# Patient Record
Sex: MALE | Race: WHITE | NOT HISPANIC OR LATINO | URBAN - METROPOLITAN AREA
[De-identification: names, ages, dates, MRNs, and addresses within clinical notes are randomized per-mention and may not be internally consistent; named-entity substitution may affect disease eponyms.]

---

## 2022-02-28 ENCOUNTER — PATIENT ROUNDING (BHMG ONLY) (OUTPATIENT)
Dept: INTERNAL MEDICINE | Facility: CLINIC | Age: 52
End: 2022-02-28

## 2022-02-28 ENCOUNTER — OFFICE VISIT (OUTPATIENT)
Dept: INTERNAL MEDICINE | Facility: CLINIC | Age: 52
End: 2022-02-28

## 2022-02-28 ENCOUNTER — TELEPHONE (OUTPATIENT)
Dept: INTERNAL MEDICINE | Facility: CLINIC | Age: 52
End: 2022-02-28

## 2022-02-28 VITALS
DIASTOLIC BLOOD PRESSURE: 88 MMHG | TEMPERATURE: 97.1 F | HEART RATE: 70 BPM | SYSTOLIC BLOOD PRESSURE: 132 MMHG | BODY MASS INDEX: 30.9 KG/M2 | WEIGHT: 215.8 LBS | OXYGEN SATURATION: 100 % | HEIGHT: 70 IN

## 2022-02-28 DIAGNOSIS — Z12.11 ENCOUNTER FOR SCREENING FOR COLORECTAL MALIGNANT NEOPLASM: ICD-10-CM

## 2022-02-28 DIAGNOSIS — Z13.89 SCREENING FOR BLOOD OR PROTEIN IN URINE: ICD-10-CM

## 2022-02-28 DIAGNOSIS — Z12.12 ENCOUNTER FOR SCREENING FOR COLORECTAL MALIGNANT NEOPLASM: ICD-10-CM

## 2022-02-28 DIAGNOSIS — Z00.00 ANNUAL PHYSICAL EXAM: Primary | ICD-10-CM

## 2022-02-28 DIAGNOSIS — E78.5 HYPERLIPIDEMIA, UNSPECIFIED HYPERLIPIDEMIA TYPE: ICD-10-CM

## 2022-02-28 DIAGNOSIS — Z23 NEED FOR TD VACCINE: ICD-10-CM

## 2022-02-28 PROBLEM — B02.9 HERPES ZOSTER WITHOUT COMPLICATION: Status: ACTIVE | Noted: 2022-02-28

## 2022-02-28 PROBLEM — E66.811 OBESITY, CLASS I, BMI 30-34.9: Status: ACTIVE | Noted: 2022-02-28

## 2022-02-28 PROBLEM — E66.9 OBESITY, CLASS I, BMI 30-34.9: Status: ACTIVE | Noted: 2022-02-28

## 2022-02-28 PROCEDURE — 99386 PREV VISIT NEW AGE 40-64: CPT | Performed by: FAMILY MEDICINE

## 2022-02-28 PROCEDURE — 90714 TD VACC NO PRESV 7 YRS+ IM: CPT | Performed by: FAMILY MEDICINE

## 2022-02-28 PROCEDURE — 90471 IMMUNIZATION ADMIN: CPT | Performed by: FAMILY MEDICINE

## 2022-02-28 NOTE — TELEPHONE ENCOUNTER
PATIENT CALLED STATING THAT HE WAS SUPPOSED TO PROVIDE THE INFORMATION FOR HIS OLD PROVIDER:    DR. KAROLINA BALL  [phone]    THIS WAS IN REGARDS TO REQUESTING MEDICAL RECORDS.    CALL BACK IF NEEDED:  [phone]

## 2022-02-28 NOTE — PROGRESS NOTES
February 28, 2022    Hello, may I speak with Tato Jacques?    My name is KIA        Tell me about your visit with us. What things went well?  NO COMPLAINTS       We're always looking for ways to make our patients' experiences even better. Do you have recommendations on ways we may improve?  no    Overall were you satisfied with your first visit to our practice? yes       I appreciate you taking the time to speak with me today. Is there anything else I can do for you? no      Thank you, and have a great day.

## 2022-03-01 LAB
ALBUMIN SERPL-MCNC: 4.7 G/DL (ref 4–5)
ALBUMIN/GLOB SERPL: 1.6 {RATIO} (ref 1.2–2.2)
ALP SERPL-CCNC: 55 IU/L (ref 44–121)
ALT SERPL-CCNC: 36 IU/L (ref 0–44)
APPEARANCE UR: CLEAR
AST SERPL-CCNC: 25 IU/L (ref 0–40)
BACTERIA #/AREA URNS HPF: NORMAL /[HPF]
BASOPHILS # BLD AUTO: 0 X10E3/UL (ref 0–0.2)
BASOPHILS NFR BLD AUTO: 0 %
BILIRUB SERPL-MCNC: 0.9 MG/DL (ref 0–1.2)
BILIRUB UR QL STRIP: NEGATIVE
BUN SERPL-MCNC: 13 MG/DL (ref 6–24)
BUN/CREAT SERPL: 13 (ref 9–20)
CALCIUM SERPL-MCNC: 9.5 MG/DL (ref 8.7–10.2)
CASTS URNS QL MICRO: NORMAL /LPF
CHLORIDE SERPL-SCNC: 101 MMOL/L (ref 96–106)
CHOLEST SERPL-MCNC: 161 MG/DL (ref 100–199)
CO2 SERPL-SCNC: 25 MMOL/L (ref 20–29)
COLOR UR: YELLOW
CREAT SERPL-MCNC: 1.04 MG/DL (ref 0.76–1.27)
EGFR GENE MUT ANL BLD/T: 87 ML/MIN/1.73
EOSINOPHIL # BLD AUTO: 0.1 X10E3/UL (ref 0–0.4)
EOSINOPHIL NFR BLD AUTO: 2 %
EPI CELLS #/AREA URNS HPF: NORMAL /HPF (ref 0–10)
ERYTHROCYTE [DISTWIDTH] IN BLOOD BY AUTOMATED COUNT: 13.8 % (ref 11.6–15.4)
FT4I SERPL CALC-MCNC: 1.9 (ref 1.2–4.9)
GLOBULIN SER CALC-MCNC: 3 G/DL (ref 1.5–4.5)
GLUCOSE SERPL-MCNC: 91 MG/DL (ref 65–99)
GLUCOSE UR QL STRIP: NEGATIVE
HCT VFR BLD AUTO: 44.9 % (ref 37.5–51)
HDLC SERPL-MCNC: 31 MG/DL
HGB BLD-MCNC: 15.8 G/DL (ref 13–17.7)
HGB UR QL STRIP: NEGATIVE
IMM GRANULOCYTES # BLD AUTO: 0 X10E3/UL (ref 0–0.1)
IMM GRANULOCYTES NFR BLD AUTO: 0 %
KETONES UR QL STRIP: NEGATIVE
LDLC SERPL CALC-MCNC: 84 MG/DL (ref 0–99)
LEUKOCYTE ESTERASE UR QL STRIP: NEGATIVE
LYMPHOCYTES # BLD AUTO: 1.8 X10E3/UL (ref 0.7–3.1)
LYMPHOCYTES NFR BLD AUTO: 40 %
MCH RBC QN AUTO: 32.9 PG (ref 26.6–33)
MCHC RBC AUTO-ENTMCNC: 35.2 G/DL (ref 31.5–35.7)
MCV RBC AUTO: 94 FL (ref 79–97)
MICRO URNS: NORMAL
MICRO URNS: NORMAL
MONOCYTES # BLD AUTO: 0.5 X10E3/UL (ref 0.1–0.9)
MONOCYTES NFR BLD AUTO: 11 %
NEUTROPHILS # BLD AUTO: 2.2 X10E3/UL (ref 1.4–7)
NEUTROPHILS NFR BLD AUTO: 47 %
NITRITE UR QL STRIP: NEGATIVE
PH UR STRIP: 5.5 [PH] (ref 5–7.5)
PLATELET # BLD AUTO: 208 X10E3/UL (ref 150–450)
POTASSIUM SERPL-SCNC: 4.4 MMOL/L (ref 3.5–5.2)
PROT SERPL-MCNC: 7.7 G/DL (ref 6–8.5)
PROT UR QL STRIP: NEGATIVE
RBC # BLD AUTO: 4.8 X10E6/UL (ref 4.14–5.8)
RBC #/AREA URNS HPF: NORMAL /HPF (ref 0–2)
SODIUM SERPL-SCNC: 138 MMOL/L (ref 134–144)
SP GR UR STRIP: 1.01 (ref 1–1.03)
T3RU NFR SERPL: 27 % (ref 24–39)
T4 SERPL-MCNC: 7.1 UG/DL (ref 4.5–12)
TRIGL SERPL-MCNC: 278 MG/DL (ref 0–149)
TSH SERPL DL<=0.005 MIU/L-ACNC: 1.55 UIU/ML (ref 0.45–4.5)
UROBILINOGEN UR STRIP-MCNC: 0.2 MG/DL (ref 0.2–1)
VLDLC SERPL CALC-MCNC: 46 MG/DL (ref 5–40)
WBC # BLD AUTO: 4.7 X10E3/UL (ref 3.4–10.8)
WBC #/AREA URNS HPF: NORMAL /HPF (ref 0–5)

## 2022-08-01 ENCOUNTER — OFFICE VISIT (OUTPATIENT)
Dept: INTERNAL MEDICINE | Facility: CLINIC | Age: 52
End: 2022-08-01

## 2022-08-01 VITALS
SYSTOLIC BLOOD PRESSURE: 122 MMHG | TEMPERATURE: 97.3 F | DIASTOLIC BLOOD PRESSURE: 88 MMHG | BODY MASS INDEX: 29.81 KG/M2 | HEIGHT: 70 IN | WEIGHT: 208.2 LBS

## 2022-08-01 DIAGNOSIS — L73.9 FOLLICULITIS: ICD-10-CM

## 2022-08-01 DIAGNOSIS — Z82.49 FAMILY HISTORY OF CARDIOVASCULAR DISEASE: ICD-10-CM

## 2022-08-01 DIAGNOSIS — R07.9 CHEST PAIN, UNSPECIFIED TYPE: Primary | ICD-10-CM

## 2022-08-01 PROCEDURE — 99214 OFFICE O/P EST MOD 30 MIN: CPT | Performed by: FAMILY MEDICINE

## 2022-08-01 PROCEDURE — 93000 ELECTROCARDIOGRAM COMPLETE: CPT | Performed by: FAMILY MEDICINE

## 2022-08-01 NOTE — PROGRESS NOTES
Date of Encounter: 2022  Patient: Tato Jacques,  1970    Subjective   History of Presenting Illness  Chief complaint: Chest pain    For about the past 6 months patient has had intermittent chest discomfort at the lower end of the sternum.  It does not radiate.  Usually occurs during strenuous exercise such as lifting, pushing heavy objects, or using a treadmill.  It is sometimes associated with shortness of breath.  He has not had an episode in about 1 month.  He cannot identify any exacerbating or alleviating factors.  He does not have heartburn.  His father who his knowledge does not have any major medical problems just underwent cardiac bypass in his 80s.  Patient does not have a personal smoking history.  He can tolerate treadmill exercise.    He does have folliculitis on his anterior right lower leg that has been treated successfully previously with Bactroban.  He needs a refill on this.  He denies fever and chills.    Review of Systems:  Negative for fever, cough, palpitations    The following portions of the patient's history were reviewed and updated as appropriate: allergies, current medications, past family history, past medical history, past social history, past surgical history and problem list.    Patient Active Problem List   Diagnosis   • Hyperlipidemia   • Herpes zoster without complication   • Obesity, Class I, BMI 30-34.9   • Family history of cardiovascular disease   • Chest pain   • Folliculitis     Past Medical History:   Diagnosis Date   • Erectile dysfunction 2002    Becareful what you wish for.     Past Surgical History:   Procedure Laterality Date   • TONSILLECTOMY       Family History   Problem Relation Age of Onset   • Diabetes Sister    • Alcohol abuse Paternal Grandfather        Current Outpatient Medications:   •  mupirocin (BACTROBAN) 2 % ointment, Apply 1 application topically to the appropriate area as directed 3 (Three) Times a Day., Disp: 22 g, Rfl: 1  No  "Known Allergies  Social History     Tobacco Use   • Smoking status: Never Smoker   • Tobacco comment: Never used it in any form   Substance Use Topics   • Alcohol use: Yes     Alcohol/week: 5.0 standard drinks     Types: 5 Cans of beer per week   • Drug use: Never          Objective   Physical Exam  Vitals:    08/01/22 1045   BP: 122/88   BP Location: Left arm   Patient Position: Sitting   Cuff Size: Large Adult   Temp: 97.3 °F (36.3 °C)   TempSrc: Infrared   Weight: 94.4 kg (208 lb 3.2 oz)   Height: 177.8 cm (70\")     Body mass index is 29.87 kg/m².    Constitutional: NAD.  Cardiovascular: RRR. No murmurs. No LE edema b/l. Radial pulses 2+ bilaterally.  Pulmonary: CTA b/l. Good effort.  Integumentary: Follicular erythema on a 6 cm x 4 cm area on his right anterior leg.  There is no tenderness, warmth, or erysipelas or cellulitis.  Lymphatic: No anterior cervical lymphadenopathy.  Endocrine: No thyromegaly or palpable thyroid nodules.  Psychiatric: Normal affect. Normal thought content.  Musculoskeletal: No tenderness to palpation in the epigastrium.     Assessment & Plan   Assessment and Plan  Pleasant 52-year-old male with history of hyperlipidemia who presents with the following:    Diagnoses and all orders for this visit:    1. Chest pain, unspecified type (Primary)  -     Stress test; Future  2. Family history of cardiovascular disease  -     Stress test; Future      ECG 12 Lead    Date/Time: 8/1/2022 11:21 AM  Performed by: Blanco Mooney MD  Authorized by: Blanco Mooney MD   Comparison: not compared with previous ECG   Rhythm: sinus rhythm  Rate: normal  Conduction: conduction normal  Q waves: I    ST Segments: ST segments normal  T Waves: T waves normal  QRS axis: mild LAD.  Other: no other findings    Clinical impression: non-specific ECG        Because of his good exercise tolerance and overall low risk he is a good candidate for treadmill stress test.  We will arrange this and follow-up as indicated.  " Discussed reasons to go to the ER.    3. Folliculitis: Discussed reasons to return if not improving  -     mupirocin (BACTROBAN) 2 % ointment; Apply 1 application topically to the appropriate area as directed 3 (Three) Times a Day.  Dispense: 22 g; Refill: 1    Blanco Mooney MD  Family Medicine  O: [phone]  C: [phone]    Disclaimer: Parts of this note were dictated by speech recognition. Minor errors in transcription may be present. Please call if questions.

## 2022-08-16 ENCOUNTER — HOSPITAL ENCOUNTER (OUTPATIENT)
Dept: CARDIOLOGY | Facility: HOSPITAL | Age: 52
Discharge: HOME | End: 2022-08-16
Admitting: FAMILY MEDICINE

## 2022-08-16 DIAGNOSIS — R07.9 CHEST PAIN, UNSPECIFIED TYPE: ICD-10-CM

## 2022-08-16 DIAGNOSIS — Z82.49 FAMILY HISTORY OF CARDIOVASCULAR DISEASE: ICD-10-CM

## 2022-08-16 LAB
BH CV STRESS BP STAGE 1: NORMAL
BH CV STRESS BP STAGE 2: NORMAL
BH CV STRESS BP STAGE 3: NORMAL
BH CV STRESS DURATION MIN STAGE 1: 3
BH CV STRESS DURATION MIN STAGE 2: 3
BH CV STRESS DURATION MIN STAGE 3: 0
BH CV STRESS DURATION SEC STAGE 1: 0
BH CV STRESS DURATION SEC STAGE 2: 0
BH CV STRESS DURATION SEC STAGE 3: 52
BH CV STRESS GRADE STAGE 1: 10
BH CV STRESS GRADE STAGE 2: 12
BH CV STRESS GRADE STAGE 3: 14
BH CV STRESS HR STAGE 1: 106
BH CV STRESS HR STAGE 2: 126
BH CV STRESS HR STAGE 3: 146
BH CV STRESS METS STAGE 1: 5
BH CV STRESS METS STAGE 2: 7.5
BH CV STRESS METS STAGE 3: 10
BH CV STRESS PROTOCOL 1: NORMAL
BH CV STRESS RECOVERY BP: NORMAL MMHG
BH CV STRESS RECOVERY HR: 99 BPM
BH CV STRESS SPEED STAGE 1: 1.7
BH CV STRESS SPEED STAGE 2: 2.5
BH CV STRESS SPEED STAGE 3: 3.4
BH CV STRESS STAGE 1: 1
BH CV STRESS STAGE 2: 2
BH CV STRESS STAGE 3: 3
MAXIMAL PREDICTED HEART RATE: 169 BPM
PERCENT MAX PREDICTED HR: 86.39 %
STRESS BASELINE BP: NORMAL MMHG
STRESS BASELINE HR: 75 BPM
STRESS PERCENT HR: 102 %
STRESS POST ESTIMATED WORKLOAD: 8.4 METS
STRESS POST EXERCISE DUR MIN: 6 MIN
STRESS POST EXERCISE DUR SEC: 53 SEC
STRESS POST PEAK BP: NORMAL MMHG
STRESS POST PEAK HR: 146 BPM
STRESS TARGET HR: 144 BPM

## 2022-08-16 PROCEDURE — 93017 CV STRESS TEST TRACING ONLY: CPT

## 2022-08-16 PROCEDURE — 93018 CV STRESS TEST I&R ONLY: CPT | Performed by: INTERNAL MEDICINE

## 2022-08-16 PROCEDURE — 93016 CV STRESS TEST SUPVJ ONLY: CPT | Performed by: INTERNAL MEDICINE

## 2022-09-16 ENCOUNTER — TELEPHONE (OUTPATIENT)
Dept: INTERNAL MEDICINE | Facility: CLINIC | Age: 52
End: 2022-09-16

## 2022-09-26 ENCOUNTER — OFFICE VISIT (OUTPATIENT)
Dept: INTERNAL MEDICINE | Facility: CLINIC | Age: 52
End: 2022-09-26

## 2022-09-26 VITALS
BODY MASS INDEX: 30.26 KG/M2 | WEIGHT: 211.4 LBS | HEIGHT: 70 IN | SYSTOLIC BLOOD PRESSURE: 120 MMHG | DIASTOLIC BLOOD PRESSURE: 82 MMHG | OXYGEN SATURATION: 99 % | HEART RATE: 71 BPM | TEMPERATURE: 96.4 F

## 2022-09-26 DIAGNOSIS — B35.4 TINEA CORPORIS: Primary | ICD-10-CM

## 2022-09-26 PROBLEM — L73.9 FOLLICULITIS: Status: RESOLVED | Noted: 2022-08-01 | Resolved: 2022-09-26

## 2022-09-26 PROCEDURE — 99213 OFFICE O/P EST LOW 20 MIN: CPT | Performed by: FAMILY MEDICINE

## 2022-09-26 RX ORDER — CLOTRIMAZOLE AND BETAMETHASONE DIPROPIONATE 10; .64 MG/G; MG/G
1 CREAM TOPICAL 2 TIMES DAILY
Qty: 45 G | Refills: 1 | Status: SHIPPED | OUTPATIENT
Start: 2022-09-26

## 2022-09-26 NOTE — PROGRESS NOTES
Date of Encounter: 2022  Patient: Tato Jacques,  1970    Subjective   History of Presenting Illness  Chief complaint: Rash    Rash on the right leg did improve with the use of Bactroban but he has had persistence of a flaky, itchy area with thickened skin that is not improving with the Bactroban.  He does feel like this has enlarged somewhat since our last appointment.  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  He does not have any similar rash in other areas.  He denies fever and chills.    Review of Systems:  Negative for fever, cough, shortness of breath    The following portions of the patient's history were reviewed and updated as appropriate: allergies, current medications, past family history, past medical history, past social history, past surgical history and problem list.    Patient Active Problem List   Diagnosis   • Hyperlipidemia   • Herpes zoster without complication   • Obesity, Class I, BMI 30-34.9   • Family history of cardiovascular disease   • Chest pain   • Folliculitis     Past Medical History:   Diagnosis Date   • Erectile dysfunction 2002    Becareful what you wish for.     Past Surgical History:   Procedure Laterality Date   • TONSILLECTOMY       Family History   Problem Relation Age of Onset   • Diabetes Sister    • Alcohol abuse Paternal Grandfather        Current Outpatient Medications:   •  clotrimazole-betamethasone (Lotrisone) 1-0.05 % cream, Apply 1 application topically to the appropriate area as directed 2 (Two) Times a Day., Disp: 45 g, Rfl: 1  •  mupirocin (BACTROBAN) 2 % ointment, Apply 1 application topically to the appropriate area as directed 3 (Three) Times a Day., Disp: 22 g, Rfl: 1  No Known Allergies  Social History     Tobacco Use   • Smoking status: Never Smoker   • Tobacco comment: Never used it in any form   Substance Use Topics   • Alcohol use: Yes     Alcohol/week: 5.0  "standard drinks     Types: 5 Cans of beer per week   • Drug use: Never          Objective   Physical Exam  Vitals:    09/26/22 1034   BP: 120/82   BP Location: Left arm   Patient Position: Sitting   Cuff Size: Large Adult   Pulse: 71   Temp: 96.4 °F (35.8 °C)   TempSrc: Infrared   SpO2: 99%   Weight: 95.9 kg (211 lb 6.4 oz)   Height: 177.8 cm (70\")     Body mass index is 30.33 kg/m².    Constitutional: NAD.  Integumentary: Approximately 10 cm in diameter area of skin thickening, flaking, mild erythema with satellite lesions around the central flaking.  There is no ulceration, discharge, or tenderness to palpation.     Assessment & Plan   Assessment and Plan  Pleasant 52-year-old male with hyperlipidemia who presents with the following:    Diagnoses and all orders for this visit:    1. Tinea corporis (Primary)  -     clotrimazole-betamethasone (Lotrisone) 1-0.05 % cream; Apply 1 application topically to the appropriate area as directed 2 (Two) Times a Day.  Dispense: 45 g; Refill: 1    Today the rash appears like tinea, will treat with Lotrisone as above.  If not improving patient to follow-up we will consider Derm referral.    Blanco Mooney MD  Family Medicine  O: [phone]  C: [phone]    Disclaimer: Parts of this note were dictated by speech recognition. Minor errors in transcription may be present. Please call if questions.     "

## 2023-03-06 ENCOUNTER — OFFICE VISIT (OUTPATIENT)
Dept: INTERNAL MEDICINE | Facility: CLINIC | Age: 53
End: 2023-03-06
Payer: COMMERCIAL

## 2023-03-06 VITALS
HEIGHT: 70 IN | OXYGEN SATURATION: 96 % | WEIGHT: 212.4 LBS | TEMPERATURE: 97.5 F | SYSTOLIC BLOOD PRESSURE: 116 MMHG | BODY MASS INDEX: 30.41 KG/M2 | HEART RATE: 72 BPM | DIASTOLIC BLOOD PRESSURE: 86 MMHG

## 2023-03-06 DIAGNOSIS — R07.9 CHEST PAIN, UNSPECIFIED TYPE: ICD-10-CM

## 2023-03-06 DIAGNOSIS — L40.9 PSORIASIS: ICD-10-CM

## 2023-03-06 DIAGNOSIS — E66.9 OBESITY, CLASS I, BMI 30-34.9: ICD-10-CM

## 2023-03-06 DIAGNOSIS — Z13.89 SCREENING FOR BLOOD OR PROTEIN IN URINE: ICD-10-CM

## 2023-03-06 DIAGNOSIS — R73.09 ELEVATED GLUCOSE: ICD-10-CM

## 2023-03-06 DIAGNOSIS — Z00.00 ANNUAL PHYSICAL EXAM: Primary | ICD-10-CM

## 2023-03-06 DIAGNOSIS — E78.5 HYPERLIPIDEMIA, UNSPECIFIED HYPERLIPIDEMIA TYPE: ICD-10-CM

## 2023-03-06 PROCEDURE — 99396 PREV VISIT EST AGE 40-64: CPT | Performed by: FAMILY MEDICINE

## 2023-03-06 NOTE — PROGRESS NOTES
Date of Encounter: 2023  Patient: Tato Jacques,  1970    Subjective   History of Presenting Illness  Chief complaint: Annual physical     No acute concerns.    Chest pain with normal cardiovascular stress test, improving overall but still has symptoms occasionally and not always associated with exercise.  Remains rare.  No shortness of breath.    History of hematuria, underwent urologic evaluation that he cannot tell me the details other than it was reportedly normal.  Has not had persistent problems.     Hyperlipidemia not on cholesterol medication     History of psoriasis does not use any topical medications.  Mostly behind the ears     Lives with wife.  No children. Never smoker.  5 alcoholic drinks per week.  Does not exercise.  Diet includes a lot of fast food because he is on the road frequently.  Declines colonoscopy, Cologuard  was normal.  No family history of prostate cancer.  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  Discussed COVID bivalent, shingles vaccine    Review of Systems:  Negative for fever, congestion, chest pain upon exertion, shortness of breath, vision changes, vomiting, dysuria, lymphadenopathy, muscle weakness, numbness, mood changes    The following portions of the patient's history were reviewed and updated as appropriate: allergies, current medications, past family history, past medical history, past social history, past surgical history and problem list.    Patient Active Problem List   Diagnosis   • Hyperlipidemia   • Herpes zoster without complication   • Obesity, Class I, BMI 30-34.9   • Family history of cardiovascular disease   • Chest pain   • Psoriasis     Past Medical History:   Diagnosis Date   • Erectile dysfunction 2002    Becareful what you wish for.     Past Surgical History:   Procedure Laterality Date   • TONSILLECTOMY       Family History   Problem Relation Age of Onset   • Diabetes Sister    • Alcohol abuse  "Paternal Grandfather        Current Outpatient Medications:   •  clotrimazole-betamethasone (Lotrisone) 1-0.05 % cream, Apply 1 application topically to the appropriate area as directed 2 (Two) Times a Day., Disp: 45 g, Rfl: 1  No Known Allergies  Social History     Tobacco Use   • Smoking status: Never   • Tobacco comments:     Never used it in any form   Substance Use Topics   • Alcohol use: Yes     Alcohol/week: 5.0 standard drinks     Types: 5 Cans of beer per week   • Drug use: Never          Objective   Physical Exam  Vitals:    03/06/23 0904   BP: 116/86   BP Location: Left arm   Patient Position: Sitting   Cuff Size: Large Adult   Pulse: 72   Temp: 97.5 °F (36.4 °C)   TempSrc: Infrared   SpO2: 96%   Weight: 96.3 kg (212 lb 6.4 oz)   Height: 177.8 cm (70\")     Body mass index is 30.48 kg/m².    Constitutional: NAD.  Eyes: EOMI. PERRLA. Normal conjunctiva.  Ear, nose, mouth, throat: No tonsillar exudates or erythema.   Normal nasal mucosa. Normal external ear canals and TMs bilaterally.  Cardiovascular: RRR. No murmurs. No LE edema b/l. Radial pulses 2+ bilaterally.  Pulmonary: CTA b/l. Good effort.  Integumentary: No rashes or wounds on face or upper extremities.  Lymphatic: No anterior cervical lymphadenopathy.  Endocrine: No thyromegaly or palpable thyroid nodules.  Psychiatric: Normal affect. Normal thought content.  Gastrointestinal: Nondistended. No hepatosplenomegaly. No focal tenderness to palpation. Normal bowel sounds.       Assessment & Plan   Assessment and Plan  Pleasant 52-year-old male with hyperlipidemia, psoriasis, BMI greater than 30, who presents for the following:    Diagnoses and all orders for this visit:    1. Annual physical exam (Primary): We discussed preventative care including age and patient-appropriate immunizations and cancer screening. We also discussed the importance of exercise and a healthy diet. This is their annual preventative exam.    2. Hyperlipidemia, unspecified " hyperlipidemia type  -     Thyroid Panel With TSH  -     CBC & Differential  -     Comprehensive Metabolic Panel  -     Lipid Panel    3. Chest pain, unspecified type: Normal cardiac stress test, resolving    4. Psoriasis: Continue to follow with dermatology as needed, minimal symptoms    5. Obesity, Class I, BMI 30-34.9: Sedentary job with ability to exercise and eat well limited by work and travel.  Discussed possible options in his context.    6. Screening for blood or protein in urine  -     Urinalysis With Microscopic - Urine, Clean Catch    7. Elevated glucose  -     Hemoglobin A1c    Return to office in 1 year for annual physical or earlier as needed.    Blanco Mooney MD  Family Medicine  O: [phone]    Disclaimer: Parts of this note were dictated by speech recognition. Minor errors in transcription may be present. Please call if questions.

## 2023-03-07 LAB
ALBUMIN SERPL-MCNC: 4.8 G/DL (ref 3.8–4.9)
ALBUMIN/GLOB SERPL: 1.7 {RATIO} (ref 1.2–2.2)
ALP SERPL-CCNC: 51 IU/L (ref 44–121)
ALT SERPL-CCNC: 36 IU/L (ref 0–44)
APPEARANCE UR: CLEAR
AST SERPL-CCNC: 25 IU/L (ref 0–40)
BACTERIA #/AREA URNS HPF: NORMAL /[HPF]
BASOPHILS # BLD AUTO: 0 X10E3/UL (ref 0–0.2)
BASOPHILS NFR BLD AUTO: 1 %
BILIRUB SERPL-MCNC: 1 MG/DL (ref 0–1.2)
BILIRUB UR QL STRIP: NEGATIVE
BUN SERPL-MCNC: 14 MG/DL (ref 6–24)
BUN/CREAT SERPL: 12 (ref 9–20)
CALCIUM SERPL-MCNC: 9.4 MG/DL (ref 8.7–10.2)
CASTS URNS QL MICRO: NORMAL /LPF
CHLORIDE SERPL-SCNC: 101 MMOL/L (ref 96–106)
CHOLEST SERPL-MCNC: 192 MG/DL (ref 100–199)
CO2 SERPL-SCNC: 25 MMOL/L (ref 20–29)
COLOR UR: YELLOW
CREAT SERPL-MCNC: 1.17 MG/DL (ref 0.76–1.27)
EGFRCR SERPLBLD CKD-EPI 2021: 75 ML/MIN/1.73
EOSINOPHIL # BLD AUTO: 0.1 X10E3/UL (ref 0–0.4)
EOSINOPHIL NFR BLD AUTO: 3 %
EPI CELLS #/AREA URNS HPF: NORMAL /HPF (ref 0–10)
ERYTHROCYTE [DISTWIDTH] IN BLOOD BY AUTOMATED COUNT: 13.2 % (ref 11.6–15.4)
FT4I SERPL CALC-MCNC: 2.1 (ref 1.2–4.9)
GLOBULIN SER CALC-MCNC: 2.9 G/DL (ref 1.5–4.5)
GLUCOSE SERPL-MCNC: 94 MG/DL (ref 70–99)
GLUCOSE UR QL STRIP: NEGATIVE
HBA1C MFR BLD: 5 % (ref 4.8–5.6)
HCT VFR BLD AUTO: 45.4 % (ref 37.5–51)
HDLC SERPL-MCNC: 35 MG/DL
HGB BLD-MCNC: 15.8 G/DL (ref 13–17.7)
HGB UR QL STRIP: NEGATIVE
IMM GRANULOCYTES # BLD AUTO: 0 X10E3/UL (ref 0–0.1)
IMM GRANULOCYTES NFR BLD AUTO: 0 %
KETONES UR QL STRIP: NEGATIVE
LDLC SERPL CALC-MCNC: 115 MG/DL (ref 0–99)
LEUKOCYTE ESTERASE UR QL STRIP: NEGATIVE
LYMPHOCYTES # BLD AUTO: 1.6 X10E3/UL (ref 0.7–3.1)
LYMPHOCYTES NFR BLD AUTO: 38 %
MCH RBC QN AUTO: 32.5 PG (ref 26.6–33)
MCHC RBC AUTO-ENTMCNC: 34.8 G/DL (ref 31.5–35.7)
MCV RBC AUTO: 93 FL (ref 79–97)
MICRO URNS: NORMAL
MICRO URNS: NORMAL
MONOCYTES # BLD AUTO: 0.5 X10E3/UL (ref 0.1–0.9)
MONOCYTES NFR BLD AUTO: 11 %
NEUTROPHILS # BLD AUTO: 2 X10E3/UL (ref 1.4–7)
NEUTROPHILS NFR BLD AUTO: 47 %
NITRITE UR QL STRIP: NEGATIVE
PH UR STRIP: 6.5 [PH] (ref 5–7.5)
PLATELET # BLD AUTO: 203 X10E3/UL (ref 150–450)
POTASSIUM SERPL-SCNC: 4.4 MMOL/L (ref 3.5–5.2)
PROT SERPL-MCNC: 7.7 G/DL (ref 6–8.5)
PROT UR QL STRIP: NEGATIVE
RBC # BLD AUTO: 4.86 X10E6/UL (ref 4.14–5.8)
RBC #/AREA URNS HPF: NORMAL /HPF (ref 0–2)
SODIUM SERPL-SCNC: 140 MMOL/L (ref 134–144)
SP GR UR STRIP: 1.01 (ref 1–1.03)
T3RU NFR SERPL: 27 % (ref 24–39)
T4 SERPL-MCNC: 7.8 UG/DL (ref 4.5–12)
TRIGL SERPL-MCNC: 241 MG/DL (ref 0–149)
TSH SERPL DL<=0.005 MIU/L-ACNC: 1.46 UIU/ML (ref 0.45–4.5)
UROBILINOGEN UR STRIP-MCNC: 0.2 MG/DL (ref 0.2–1)
VLDLC SERPL CALC-MCNC: 42 MG/DL (ref 5–40)
WBC # BLD AUTO: 4.2 X10E3/UL (ref 3.4–10.8)
WBC #/AREA URNS HPF: NORMAL /HPF (ref 0–5)

## 2023-03-09 NOTE — PROGRESS NOTES
Your blood work looks good except that your cholesterol has worsened significantly since the last time we checked it.  Fortunately, your overall health is good, I do not recommend a medication at this time but we do need to continue to monitor this due to your family history.  Try to minimize red meat and animal fats in your diet.

## 2023-10-05 ENCOUNTER — PATIENT MESSAGE (OUTPATIENT)
Dept: INTERNAL MEDICINE | Facility: CLINIC | Age: 53
End: 2023-10-05
Payer: COMMERCIAL

## 2023-10-05 DIAGNOSIS — B35.4 TINEA CORPORIS: ICD-10-CM

## 2023-10-05 RX ORDER — CLOTRIMAZOLE AND BETAMETHASONE DIPROPIONATE 10; .64 MG/G; MG/G
1 CREAM TOPICAL 2 TIMES DAILY
Qty: 45 G | Refills: 1 | Status: SHIPPED | OUTPATIENT
Start: 2023-10-05 | End: 2023-10-06 | Stop reason: SDUPTHER

## 2023-10-05 NOTE — TELEPHONE ENCOUNTER
Rx Refill Note  Requested Prescriptions     Pending Prescriptions Disp Refills    clotrimazole-betamethasone (Lotrisone) 1-0.05 % cream 45 g 1     Sig: Apply 1 application  topically to the appropriate area as directed 2 (Two) Times a Day.      Last office visit with prescribing clinician: 3/6/2023   Last telemedicine visit with prescribing clinician: Visit date not found   Next office visit with prescribing clinician: 3/11/2024                         Would you like a call back once the refill request has been completed: [] Yes [] No    If the office needs to give you a call back, can they leave a voicemail: [] Yes [] No    Carol Hyde MA  10/05/23, 13:57 EDT

## 2023-10-06 RX ORDER — CLOTRIMAZOLE AND BETAMETHASONE DIPROPIONATE 10; .64 MG/G; MG/G
1 CREAM TOPICAL 2 TIMES DAILY
Qty: 45 G | Refills: 3 | Status: SHIPPED | OUTPATIENT
Start: 2023-10-06

## 2023-10-07 NOTE — TELEPHONE ENCOUNTER
From: Tato Jacques  To: Blanco Mooney  Sent: 10/5/2023 8:19 PM EDT  Subject: Leg rash    Dr Mooney, thatvrash on my leg has generally subsided but it has flared up over the past week.    I have a little bit of the cream left and I was wondering if I can get a refill please.    I submitted a request but the reply somewhat indicated that I should send in a direct request.    Thank you.    Thanks you

## 2023-12-13 NOTE — PROGRESS NOTES
Addended by: Kelly Sylvester on: 12/13/2023 10:48 AM     Modules accepted: Orders Date of Encounter: 2022  Patient: Tato Jacques,  1970    Subjective   History of Presenting Illness  Chief complaint: Annual physical    No acute concerns.    History of hematuria, underwent urologic evaluation that he cannot tell me the details of that it was reportedly normal.  Has not had persistent problems.    History of echocardiogram which was reportedly normal.    History of high cholesterol, not on any cholesterol medication.    History of suspected shingles right lower leg.    Lives with wife.  Never smoker.  5 alcoholic drinks per week.  Does not exercise.  Diet includes a lot of fast food because he is on the road frequently.  Declines colonoscopy but is amenable to Cologuard.  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  Amenable to Td, discussed shingles, declines influenza vaccination.    Review of Systems:  Negative for fever, congestion, chest pain upon exertion, shortness of breath, vision changes, vomiting, dysuria, lymphadenopathy, muscle weakness, numbness, mood changes    Positive for rash right lower leg    The following portions of the patient's history were reviewed and updated as appropriate: allergies, current medications, past family history, past medical history, past social history, past surgical history and problem list.    Patient Active Problem List   Diagnosis   • Hyperlipidemia   • Herpes zoster without complication     History reviewed. No pertinent past medical history.  Past Surgical History:   Procedure Laterality Date   • TONSILLECTOMY       Family History   Problem Relation Age of Onset   • Diabetes Sister    • Alcohol abuse Paternal Grandfather      No current outpatient medications on file.  No Known Allergies  Social History     Tobacco Use   • Smoking status: Not on file   • Smokeless tobacco: Not on file   Substance Use Topics   • Alcohol use: Not on file   • Drug use: Not on file          Objective   Physical Exam  Vitals:  "   02/28/22 0832   BP: 132/88   Pulse: 70   Temp: 97.1 °F (36.2 °C)   TempSrc: Temporal   SpO2: 100%   Weight: 97.9 kg (215 lb 12.8 oz)   Height: 177.8 cm (70\")     Body mass index is 30.96 kg/m².    Constitutional: NAD.  Eyes: EOMI. PERRLA. Normal conjunctiva.  Ear, nose, mouth, throat: No tonsillar exudates or erythema.   Normal nasal mucosa. Normal external ear canals and TMs bilaterally.  Cardiovascular: RRR. No murmurs. No LE edema b/l. Radial pulses 2+ bilaterally.  Pulmonary: CTA b/l. Good effort.  Integumentary: Vesicular rash on the lower anterior leg, erythematous, no evidence of cellulitis.  Appears to be resolving.  Lymphatic: No anterior cervical lymphadenopathy.  Endocrine: No thyromegaly or palpable thyroid nodules.  Psychiatric: Normal affect. Normal thought content.     Assessment/Plan   Assessment and Plan  Pleasant 51-year-old male with history of hyperlipidemia, he history of hematuria, who presents with the following:    Diagnoses and all orders for this visit:    1. Annual physical exam (Primary): We discussed preventative care including age and patient-appropriate immunizations and cancer screening. We also discussed the importance of exercise and a healthy diet. This is their annual preventative exam.    2. Hyperlipidemia, unspecified hyperlipidemia type  -     Lipid Panel  -     Comprehensive Metabolic Panel  -     CBC & Differential  -     Thyroid Panel With TSH    3. Encounter for screening for colorectal malignant neoplasm: Declines colonoscopy after discussion of risks and benefits, he is amenable to Cologuard and while this is not the typical use for Cologuard I do think it is better than no screening  -     Cologuard - Stool, Per Rectum; Future    4. Screening for blood or protein in urine  -     Urinalysis With Microscopic - Urine, Clean Catch    5. Need for Td vaccine  -     Td Vaccine Greater Than or Equal To 8yo Preservative Free IM    Return to office in 1 year for annual physical " or earlier as needed.    Blanco Mooney MD  Family Medicine  O: [phone]  C: [phone]    Disclaimer: Parts of this note were dictated by speech recognition. Minor errors in transcription may be present. Please call if questions.

## 2024-03-11 ENCOUNTER — OFFICE VISIT (OUTPATIENT)
Dept: INTERNAL MEDICINE | Facility: CLINIC | Age: 54
End: 2024-03-11
Payer: COMMERCIAL

## 2024-03-11 VITALS
TEMPERATURE: 97.5 F | SYSTOLIC BLOOD PRESSURE: 128 MMHG | BODY MASS INDEX: 30.42 KG/M2 | OXYGEN SATURATION: 96 % | DIASTOLIC BLOOD PRESSURE: 88 MMHG | WEIGHT: 212 LBS | HEART RATE: 73 BPM

## 2024-03-11 DIAGNOSIS — Z12.11 ENCOUNTER FOR SCREENING FOR COLORECTAL MALIGNANT NEOPLASM: ICD-10-CM

## 2024-03-11 DIAGNOSIS — E78.5 HYPERLIPIDEMIA, UNSPECIFIED HYPERLIPIDEMIA TYPE: ICD-10-CM

## 2024-03-11 DIAGNOSIS — L40.9 PSORIASIS: ICD-10-CM

## 2024-03-11 DIAGNOSIS — R73.09 ELEVATED GLUCOSE: ICD-10-CM

## 2024-03-11 DIAGNOSIS — Z12.5 SCREENING FOR MALIGNANT NEOPLASM OF PROSTATE: ICD-10-CM

## 2024-03-11 DIAGNOSIS — Z13.89 SCREENING FOR BLOOD OR PROTEIN IN URINE: ICD-10-CM

## 2024-03-11 DIAGNOSIS — Z00.00 ANNUAL PHYSICAL EXAM: Primary | ICD-10-CM

## 2024-03-11 DIAGNOSIS — Z12.12 ENCOUNTER FOR SCREENING FOR COLORECTAL MALIGNANT NEOPLASM: ICD-10-CM

## 2024-03-11 DIAGNOSIS — E66.9 OBESITY, CLASS I, BMI 30-34.9: ICD-10-CM

## 2024-03-11 PROBLEM — B02.9 HERPES ZOSTER WITHOUT COMPLICATION: Status: RESOLVED | Noted: 2022-02-28 | Resolved: 2024-03-11

## 2024-03-11 PROBLEM — R07.9 CHEST PAIN: Status: RESOLVED | Noted: 2022-08-01 | Resolved: 2024-03-11

## 2024-03-11 NOTE — PROGRESS NOTES
Date of Encounter: 2024  Patient: Tato Jacques,  1970    Subjective   History of Presenting Illness  Chief complaint: Annual physical    No acute concerns.    Hyperlipidemia with no recent lifestyle changes.    Psoriasis of the external ear canals, scalp, 1 small genital lesion, managed with topical hydrocortisone as needed.  Not worsening.  Did contribute to an earwax impaction earlier this year.    History of hematuria, underwent urologic evaluation that he cannot tell me the details other than it was reportedly normal.  Has not had persistent problems.      Lives with wife.  No children. Never smoker.  5 alcoholic drinks per week.  Does not exercise.  Diet includes a lot of fast food because he is on the road frequently but he tries to make healthy choices.  Cologuard  was normal, he is amenable to colonoscopy this year.  No family history of prostate cancer.  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  Discussed COVID, shingles vaccine    The following portions of the patient's history were reviewed and updated as appropriate: allergies, current medications, past family history, past medical history, past social history, past surgical history and problem list.    Patient Active Problem List   Diagnosis    Hyperlipidemia    Obesity, Class I, BMI 30-34.9    Family history of cardiovascular disease    Psoriasis     Past Medical History:   Diagnosis Date    Chest pain 2022    Erectile dysfunction  05 05    Becareful what you wish for.    Herpes zoster without complication 2022     Past Surgical History:   Procedure Laterality Date    TONSILLECTOMY       Family History   Problem Relation Age of Onset    Diabetes Sister         Half sister    Alcohol abuse Paternal Grandfather        Current Outpatient Medications:     clotrimazole-betamethasone (Lotrisone) 1-0.05 % cream, Apply 1 application  topically to the appropriate area as directed 2 (Two)  Times a Day., Disp: 45 g, Rfl: 3  No Known Allergies  Social History     Tobacco Use    Smoking status: Never    Tobacco comments:     Never used it in any form   Substance Use Topics    Alcohol use: Yes     Alcohol/week: 5.0 standard drinks of alcohol     Types: 5 Cans of beer per week    Drug use: Never          Objective   Physical Exam  Vitals:    03/11/24 0912   BP: 128/88   BP Location: Left arm   Patient Position: Sitting   Cuff Size: Adult   Pulse: 73   Temp: 97.5 °F (36.4 °C)   TempSrc: Infrared   SpO2: 96%   Weight: 96.2 kg (212 lb)     Body mass index is 30.42 kg/m².    Constitutional: NAD.  Eyes: EOMI. PERRLA. Normal conjunctiva.  Ear, nose, mouth, throat: No tonsillar exudates or erythema.   Normal nasal mucosa. Normal external ear canals and TMs bilaterally.  Cardiovascular: RRR. No murmurs. No LE edema b/l. Radial pulses 2+ bilaterally.  Pulmonary: CTA b/l. Good effort.  Integumentary: No rashes or wounds on face or upper extremities.  Lymphatic: No anterior cervical lymphadenopathy.  Endocrine: No thyromegaly or palpable thyroid nodules.  Psychiatric: Normal affect. Normal thought content.  Gastrointestinal: Nondistended. No hepatosplenomegaly. No focal tenderness to palpation. Normal bowel sounds.       Assessment & Plan   Assessment and Plan  Pleasant 53-year-old male with hyperlipidemia, psoriasis, BMI greater than 30, who presents for the following:    Diagnoses and all orders for this visit:    1. Annual physical exam (Primary): We discussed preventative care including age and patient-appropriate immunizations and cancer screening. We also discussed the importance of exercise and a healthy diet. This is their annual preventative exam.    2. Hyperlipidemia, unspecified hyperlipidemia type: Discussed lifestyle measures.  10-year ASCVD less than 6%.  -     CBC & Differential  -     Comprehensive Metabolic Panel  -     Lipid Panel  -     Thyroid Panel With TSH    3. Psoriasis: Controlled    4.  Obesity, Class I, BMI 30-34.9: Discussed lifestyle measures    5. Screening for blood or protein in urine  -     Urinalysis With Microscopic - Urine, Clean Catch    6. Screening for malignant neoplasm of prostate  -     PSA Screen    7. Encounter for screening for colorectal malignant neoplasm  -     Ambulatory Referral For Screening Colonoscopy    8. Elevated glucose  -     Hemoglobin A1c    BMI is >= 30 and <35. (Class 1 Obesity). The following options were offered after discussion;: exercise counseling/recommendations and nutrition counseling/recommendations    Return to office in 1 year for annual physical or earlier as needed.    Blanco Mooney MD  Family Medicine  O: [phone]    Disclaimer: Parts of this note were dictated by speech recognition. Minor errors in transcription may be present. Please call if questions.

## 2024-03-12 LAB
ALBUMIN SERPL-MCNC: 4.5 G/DL (ref 3.8–4.9)
ALBUMIN/GLOB SERPL: 1.5 {RATIO} (ref 1.2–2.2)
ALP SERPL-CCNC: 58 IU/L (ref 44–121)
ALT SERPL-CCNC: 31 IU/L (ref 0–44)
APPEARANCE UR: CLEAR
AST SERPL-CCNC: 22 IU/L (ref 0–40)
BACTERIA #/AREA URNS HPF: NORMAL /[HPF]
BASOPHILS # BLD AUTO: 0 X10E3/UL (ref 0–0.2)
BASOPHILS NFR BLD AUTO: 1 %
BILIRUB SERPL-MCNC: 0.6 MG/DL (ref 0–1.2)
BILIRUB UR QL STRIP: NEGATIVE
BUN SERPL-MCNC: 14 MG/DL (ref 6–24)
BUN/CREAT SERPL: 13 (ref 9–20)
CALCIUM SERPL-MCNC: 9.1 MG/DL (ref 8.7–10.2)
CASTS URNS QL MICRO: NORMAL /LPF
CHLORIDE SERPL-SCNC: 102 MMOL/L (ref 96–106)
CHOLEST SERPL-MCNC: 169 MG/DL (ref 100–199)
CO2 SERPL-SCNC: 23 MMOL/L (ref 20–29)
COLOR UR: YELLOW
CREAT SERPL-MCNC: 1.11 MG/DL (ref 0.76–1.27)
EGFRCR SERPLBLD CKD-EPI 2021: 80 ML/MIN/1.73
EOSINOPHIL # BLD AUTO: 0.1 X10E3/UL (ref 0–0.4)
EOSINOPHIL NFR BLD AUTO: 1 %
EPI CELLS #/AREA URNS HPF: NORMAL /HPF (ref 0–10)
ERYTHROCYTE [DISTWIDTH] IN BLOOD BY AUTOMATED COUNT: 13.4 % (ref 11.6–15.4)
FT4I SERPL CALC-MCNC: 2.1 (ref 1.2–4.9)
GLOBULIN SER CALC-MCNC: 3.1 G/DL (ref 1.5–4.5)
GLUCOSE SERPL-MCNC: 95 MG/DL (ref 70–99)
GLUCOSE UR QL STRIP: NEGATIVE
HBA1C MFR BLD: 5 % (ref 4.8–5.6)
HCT VFR BLD AUTO: 44 % (ref 37.5–51)
HDLC SERPL-MCNC: 27 MG/DL
HGB BLD-MCNC: 15.1 G/DL (ref 13–17.7)
HGB UR QL STRIP: NEGATIVE
IMM GRANULOCYTES # BLD AUTO: 0 X10E3/UL (ref 0–0.1)
IMM GRANULOCYTES NFR BLD AUTO: 0 %
KETONES UR QL STRIP: NEGATIVE
LDLC SERPL CALC-MCNC: 85 MG/DL (ref 0–99)
LEUKOCYTE ESTERASE UR QL STRIP: NEGATIVE
LYMPHOCYTES # BLD AUTO: 1.5 X10E3/UL (ref 0.7–3.1)
LYMPHOCYTES NFR BLD AUTO: 33 %
MCH RBC QN AUTO: 32.3 PG (ref 26.6–33)
MCHC RBC AUTO-ENTMCNC: 34.3 G/DL (ref 31.5–35.7)
MCV RBC AUTO: 94 FL (ref 79–97)
MICRO URNS: NORMAL
MICRO URNS: NORMAL
MONOCYTES # BLD AUTO: 0.5 X10E3/UL (ref 0.1–0.9)
MONOCYTES NFR BLD AUTO: 10 %
NEUTROPHILS # BLD AUTO: 2.5 X10E3/UL (ref 1.4–7)
NEUTROPHILS NFR BLD AUTO: 55 %
NITRITE UR QL STRIP: NEGATIVE
PH UR STRIP: 6 [PH] (ref 5–7.5)
PLATELET # BLD AUTO: 201 X10E3/UL (ref 150–450)
POTASSIUM SERPL-SCNC: 4.5 MMOL/L (ref 3.5–5.2)
PROT SERPL-MCNC: 7.6 G/DL (ref 6–8.5)
PROT UR QL STRIP: NEGATIVE
PSA SERPL-MCNC: 0.3 NG/ML (ref 0–4)
RBC # BLD AUTO: 4.67 X10E6/UL (ref 4.14–5.8)
RBC #/AREA URNS HPF: NORMAL /HPF (ref 0–2)
SODIUM SERPL-SCNC: 140 MMOL/L (ref 134–144)
SP GR UR STRIP: 1.01 (ref 1–1.03)
T3RU NFR SERPL: 28 % (ref 24–39)
T4 SERPL-MCNC: 7.6 UG/DL (ref 4.5–12)
TRIGL SERPL-MCNC: 348 MG/DL (ref 0–149)
TSH SERPL DL<=0.005 MIU/L-ACNC: 1.41 UIU/ML (ref 0.45–4.5)
UROBILINOGEN UR STRIP-MCNC: 0.2 MG/DL (ref 0.2–1)
VLDLC SERPL CALC-MCNC: 57 MG/DL (ref 5–40)
WBC # BLD AUTO: 4.6 X10E3/UL (ref 3.4–10.8)
WBC #/AREA URNS HPF: NORMAL /HPF (ref 0–5)

## 2024-03-12 NOTE — PROGRESS NOTES
Triglycerides are up a bit, I recommend minimizing alcohol and fried foods as much as possible. Fortunately your LDL cholesterol is good so at this time I do not recommend a medication, but we should continue to follow it annually.    I have no other concerns

## 2024-06-11 ENCOUNTER — TELEPHONE (OUTPATIENT)
Dept: GASTROENTEROLOGY | Facility: CLINIC | Age: 54
End: 2024-06-11
Payer: COMMERCIAL

## 2024-06-11 NOTE — TELEPHONE ENCOUNTER
FIRST COLONOSCOPY-REFERRAL  SCREENING  FAMILY HISTORY:FATHER/POLYP/60  DID MICHELLE RECTAL BLEEDING  SCHEDULE AT [facility]

## 2024-06-17 DIAGNOSIS — Z12.11 ENCOUNTER FOR SCREENING FOR MALIGNANT NEOPLASM OF COLON: Primary | ICD-10-CM

## 2024-06-17 NOTE — TELEPHONE ENCOUNTER
SCHEDULED AT [facility] 7-22-24 ARRIVE      WILL SEND [email or web address]

## 2024-06-18 PROBLEM — Z12.11 ENCOUNTER FOR SCREENING FOR MALIGNANT NEOPLASM OF COLON: Status: ACTIVE | Noted: 2024-06-17

## 2024-07-19 ENCOUNTER — ANESTHESIA EVENT (OUTPATIENT)
Dept: PERIOP | Facility: HOSPITAL | Age: 54
End: 2024-07-19
Payer: COMMERCIAL

## 2024-07-22 ENCOUNTER — ANESTHESIA (OUTPATIENT)
Dept: PERIOP | Facility: HOSPITAL | Age: 54
End: 2024-07-22
Payer: COMMERCIAL

## 2024-07-22 ENCOUNTER — HOSPITAL ENCOUNTER (OUTPATIENT)
Facility: HOSPITAL | Age: 54
Setting detail: HOSPITAL OUTPATIENT SURGERY
Discharge: HOME | End: 2024-07-22
Attending: STUDENT IN AN ORGANIZED HEALTH CARE EDUCATION/TRAINING PROGRAM | Admitting: STUDENT IN AN ORGANIZED HEALTH CARE EDUCATION/TRAINING PROGRAM
Payer: COMMERCIAL

## 2024-07-22 VITALS
SYSTOLIC BLOOD PRESSURE: 106 MMHG | BODY MASS INDEX: 30.36 KG/M2 | WEIGHT: 211.6 LBS | RESPIRATION RATE: 16 BRPM | TEMPERATURE: 97.4 F | DIASTOLIC BLOOD PRESSURE: 76 MMHG | HEART RATE: 73 BPM | OXYGEN SATURATION: 97 %

## 2024-07-22 DIAGNOSIS — Z12.11 ENCOUNTER FOR SCREENING FOR MALIGNANT NEOPLASM OF COLON: ICD-10-CM

## 2024-07-22 PROCEDURE — 88305 TISSUE EXAM BY PATHOLOGIST: CPT | Performed by: STUDENT IN AN ORGANIZED HEALTH CARE EDUCATION/TRAINING PROGRAM

## 2024-07-22 PROCEDURE — 25810000003 LACTATED RINGERS PER 1000 ML: Performed by: NURSE ANESTHETIST, CERTIFIED REGISTERED

## 2024-07-22 PROCEDURE — 45380 COLONOSCOPY AND BIOPSY: CPT | Performed by: STUDENT IN AN ORGANIZED HEALTH CARE EDUCATION/TRAINING PROGRAM

## 2024-07-22 PROCEDURE — 25010000002 PROPOFOL 200 MG/20ML EMULSION: Performed by: NURSE ANESTHETIST, CERTIFIED REGISTERED

## 2024-07-22 RX ORDER — SODIUM CHLORIDE, SODIUM LACTATE, POTASSIUM CHLORIDE, CALCIUM CHLORIDE 600; 310; 30; 20 MG/100ML; MG/100ML; MG/100ML; MG/100ML
9 INJECTION, SOLUTION INTRAVENOUS CONTINUOUS PRN
Status: DISCONTINUED | OUTPATIENT
Start: 2024-07-22 | End: 2024-07-22 | Stop reason: HOSPADM

## 2024-07-22 RX ORDER — SODIUM CHLORIDE 0.9 % (FLUSH) 0.9 %
10 SYRINGE (ML) INJECTION AS NEEDED
Status: DISCONTINUED | OUTPATIENT
Start: 2024-07-22 | End: 2024-07-22 | Stop reason: HOSPADM

## 2024-07-22 RX ORDER — SODIUM CHLORIDE 0.9 % (FLUSH) 0.9 %
10 SYRINGE (ML) INJECTION EVERY 12 HOURS SCHEDULED
Status: DISCONTINUED | OUTPATIENT
Start: 2024-07-22 | End: 2024-07-22 | Stop reason: HOSPADM

## 2024-07-22 RX ORDER — SODIUM CHLORIDE 9 MG/ML
40 INJECTION, SOLUTION INTRAVENOUS AS NEEDED
Status: DISCONTINUED | OUTPATIENT
Start: 2024-07-22 | End: 2024-07-22 | Stop reason: HOSPADM

## 2024-07-22 RX ORDER — LIDOCAINE HYDROCHLORIDE 10 MG/ML
0.5 INJECTION, SOLUTION INFILTRATION; PERINEURAL ONCE AS NEEDED
Status: DISCONTINUED | OUTPATIENT
Start: 2024-07-22 | End: 2024-07-22 | Stop reason: HOSPADM

## 2024-07-22 RX ORDER — SODIUM CHLORIDE, SODIUM LACTATE, POTASSIUM CHLORIDE, CALCIUM CHLORIDE 600; 310; 30; 20 MG/100ML; MG/100ML; MG/100ML; MG/100ML
100 INJECTION, SOLUTION INTRAVENOUS ONCE
Status: DISCONTINUED | OUTPATIENT
Start: 2024-07-22 | End: 2024-07-22 | Stop reason: HOSPADM

## 2024-07-22 RX ORDER — SODIUM CHLORIDE, SODIUM LACTATE, POTASSIUM CHLORIDE, CALCIUM CHLORIDE 600; 310; 30; 20 MG/100ML; MG/100ML; MG/100ML; MG/100ML
9 INJECTION, SOLUTION INTRAVENOUS CONTINUOUS
Status: DISCONTINUED | OUTPATIENT
Start: 2024-07-22 | End: 2024-07-22 | Stop reason: HOSPADM

## 2024-07-22 RX ORDER — DIPHENOXYLATE HYDROCHLORIDE AND ATROPINE SULFATE 2.5; .025 MG/1; MG/1
TABLET ORAL DAILY
COMMUNITY

## 2024-07-22 RX ORDER — ONDANSETRON 2 MG/ML
4 INJECTION INTRAMUSCULAR; INTRAVENOUS ONCE AS NEEDED
Status: DISCONTINUED | OUTPATIENT
Start: 2024-07-22 | End: 2024-07-22 | Stop reason: HOSPADM

## 2024-07-22 RX ORDER — PROPOFOL 10 MG/ML
INJECTION, EMULSION INTRAVENOUS AS NEEDED
Status: DISCONTINUED | OUTPATIENT
Start: 2024-07-22 | End: 2024-07-22 | Stop reason: SURG

## 2024-07-22 RX ORDER — LIDOCAINE HYDROCHLORIDE 20 MG/ML
INJECTION, SOLUTION INFILTRATION; PERINEURAL AS NEEDED
Status: DISCONTINUED | OUTPATIENT
Start: 2024-07-22 | End: 2024-07-22 | Stop reason: SURG

## 2024-07-22 RX ADMIN — PROPOFOL 600 MG: 10 INJECTION, EMULSION INTRAVENOUS at 08:16

## 2024-07-22 RX ADMIN — LIDOCAINE HYDROCHLORIDE 100 MG: 20 INJECTION, SOLUTION INFILTRATION; PERINEURAL at 08:16

## 2024-07-22 RX ADMIN — SODIUM CHLORIDE, POTASSIUM CHLORIDE, SODIUM LACTATE AND CALCIUM CHLORIDE 9 ML/HR: 600; 310; 30; 20 INJECTION, SOLUTION INTRAVENOUS at 07:08

## 2024-07-22 NOTE — H&P
Patient Care Team:  Blanco Mooney MD as PCP - General (Family Medicine)    CHIEF COMPLAINT: Screening CRC    HISTORY OF PRESENT ILLNESS:  For average risk screening (1st c/s)    Past Medical History:   Diagnosis Date    Chest pain 08/01/2022    Erectile dysfunction 2002 05 05    Becareful what you wish for.    Herpes zoster without complication 02/28/2022     Past Surgical History:   Procedure Laterality Date    TONSILLECTOMY  1976     Family History   Problem Relation Age of Onset    Diabetes Sister         Half sister    Alcohol abuse Paternal Grandfather      Social History     Tobacco Use    Smoking status: Never    Tobacco comments:     Never used it in any form   Vaping Use    Vaping status: Never Used   Substance Use Topics    Alcohol use: Yes     Alcohol/week: 5.0 standard drinks of alcohol     Types: 5 Cans of beer per week    Drug use: Never     Medications Prior to Admission   Medication Sig Dispense Refill Last Dose    clotrimazole-betamethasone (Lotrisone) 1-0.05 % cream Apply 1 application  topically to the appropriate area as directed 2 (Two) Times a Day. 45 g 3 Past Week    multivitamin (MULTI VITAMIN DAILY PO) Take  by mouth Daily.   Past Week     Allergies:  Patient has no known allergies.    REVIEW OF SYSTEMS:  Please see the above history of present illness for pertinent positives and negatives.  The remainder of the patient's systems have been reviewed and are negative.     Vital Signs  Temp:  [98.4 °F (36.9 °C)] 98.4 °F (36.9 °C)  Heart Rate:  [84-90] 84  Resp:  [15] 15  BP: (135-149)/() 135/97    Flowsheet Rows      Flowsheet Row First Filed Value   Admission Height --   Admission Weight 96 kg (211 lb 9.6 oz) Documented at 07/22/2024 0651             Physical Exam:  Physical Exam   Constitutional: Patient appears well-developed and well-nourished and in no acute distress   HEENT:   Head: Normocephalic and atraumatic.   Eyes:  Pupils are equal, round, and reactive to light. EOM are  intact. Sclerae are anicteric and non-injected.  Mouth and Throat: Patient has moist mucous membranes. Oropharynx is clear of any erythema or exudate.     Neck: Neck supple. No JVD present. No thyromegaly present. No lymphadenopathy present.  Cardiovascular: Regular rate, regular rhythm, S1 normal and S2 normal.  Exam reveals no gallop and no friction rub.  No murmur heard.  Pulmonary/Chest: Lungs are clear to auscultation bilaterally. No respiratory distress. No wheezes. No rhonchi. No rales.   Abdominal: Soft. Bowel sounds are normal. No distension and no mass. There is no hepatosplenomegaly. There is no tenderness.   Musculoskeletal: Normal Muscle tone  Extremities: No edema. Pulses are palpable in all 4 extremities.  Neurological: Patient is alert and oriented to person, place, and time. Cranial nerves II-XII are grossly intact with no focal deficits.  Skin: Skin is warm. No rash noted. Nails show no clubbing.  No cyanosis or erythema.    Debilities/Disabilities Identified: None  Emotional Behavior: Appropriate     Results Review:   I reviewed the patient's new clinical results.    Lab Results (most recent)       None            Imaging Results (Most Recent)       None          reviewed    ECG/EMG Results (most recent)       None          reviewed    Assessment & Plan   Screening CRC/  colonoscopy      I discussed the patient's findings and my recommendations with patient.     Dandy Hernandez MD  07/22/24  08:15 EDT    Time: 10 min prior to procedure.

## 2024-07-22 NOTE — ANESTHESIA POSTPROCEDURE EVALUATION
Patient: Tato Jacques    Procedure Summary       Date: 07/22/24 Room / Location: [facility] ENDOSCOPY 2 /  [facility] OR    Anesthesia Start: 0809 Anesthesia Stop: 0852    Procedure: COLONOSCOPY WITH POLYPECTOMY Diagnosis:       Encounter for screening for malignant neoplasm of colon      (Encounter for screening for malignant neoplasm of colon [Z12.11])    Surgeons: Dandy Hernandez MD Provider: Sinan Miles CRNA    Anesthesia Type: MAC ASA Status: 2            Anesthesia Type: MAC    Vitals  Vitals Value Taken Time   BP 85/59 07/22/24 0910   Temp 97.4 °F (36.3 °C) 07/22/24 0853   Pulse 76 07/22/24 0912   Resp 16 07/22/24 0900   SpO2 97 % 07/22/24 0912   Vitals shown include unfiled device data.        Post Anesthesia Care and Evaluation    Patient location during evaluation: bedside  Patient participation: complete - patient participated  Level of consciousness: awake and alert  Pain score: 0  Pain management: adequate    Airway patency: patent  Anesthetic complications: No anesthetic complications  PONV Status: none  Cardiovascular status: acceptable  Respiratory status: acceptable  Hydration status: acceptable  No anesthesia care post op

## 2024-07-22 NOTE — ANESTHESIA PREPROCEDURE EVALUATION
Anesthesia Evaluation     Patient summary reviewed and Nursing notes reviewed   no history of anesthetic complications:   NPO Solid Status: > 8 hours  NPO Liquid Status: > 8 hours           Airway   Mallampati: II  TM distance: <3 FB  Neck ROM: full  No difficulty expected and Anterior  Dental          Pulmonary - normal exam    breath sounds clear to auscultation  Cardiovascular - normal exam    Rhythm: regular  Rate: normal    (+) hyperlipidemia    ROS comment: 8/16/22 Treadmill stress test:  -Patient exercised for 6 minutes per standard Jadiel protocol.  -No ECG evidence of myocardial ischemia. Negative clinical evidence of myocardial ischemia. Findings consistent with a normal ECG stress test.    8/1/21 ECG:  Rhythm: sinus rhythm  Rate: normal  Conduction: conduction normal  Q waves: I  ST Segments: ST segments normal  T Waves: T waves normal  QRS axis: mild LAD.  Other: no other findings  Clinical impression: non-specific ECG        Neuro/Psych- negative ROS  GI/Hepatic/Renal/Endo - negative ROS     Musculoskeletal (-) negative ROS    Abdominal     Abdomen: soft.  Bowel sounds: normal.   Substance History   (+) alcohol use (1-2 beers a day)     OB/GYN negative ob/gyn ROS         Other - negative ROS                         Anesthesia Plan    ASA 2     MAC     intravenous induction     Anesthetic plan, risks, benefits, and alternatives have been provided, discussed and informed consent has been obtained with: patient and spouse/significant other.  Pre-procedure education provided  Use of blood products discussed with patient and spouse/significant other  Consented to blood products.    Plan discussed with CRNA.        CODE STATUS:

## 2024-07-25 DIAGNOSIS — B35.4 TINEA CORPORIS: ICD-10-CM

## 2024-07-25 LAB
LAB AP CASE REPORT: NORMAL
PATH REPORT.FINAL DX SPEC: NORMAL
PATH REPORT.GROSS SPEC: NORMAL

## 2024-07-25 RX ORDER — CLOTRIMAZOLE AND BETAMETHASONE DIPROPIONATE 10; .64 MG/G; MG/G
1 CREAM TOPICAL 2 TIMES DAILY
Qty: 45 G | Refills: 3 | Status: SHIPPED | OUTPATIENT
Start: 2024-07-25

## 2024-07-29 NOTE — PROGRESS NOTES
- For average risk screening  - Findings/path: a sub-cm hyperplastic polyp removed   - POC: repeat c/s in 10 years

## 2024-09-19 ENCOUNTER — OFFICE VISIT (OUTPATIENT)
Dept: INTERNAL MEDICINE | Facility: CLINIC | Age: 54
End: 2024-09-19
Payer: COMMERCIAL

## 2024-09-19 VITALS
HEART RATE: 83 BPM | HEIGHT: 70 IN | TEMPERATURE: 97.7 F | SYSTOLIC BLOOD PRESSURE: 124 MMHG | WEIGHT: 218 LBS | BODY MASS INDEX: 31.21 KG/M2 | OXYGEN SATURATION: 98 % | DIASTOLIC BLOOD PRESSURE: 88 MMHG

## 2024-09-19 DIAGNOSIS — R21 RASH: Primary | ICD-10-CM

## 2024-09-19 DIAGNOSIS — L40.9 PSORIASIS: ICD-10-CM

## 2024-09-19 PROCEDURE — 99212 OFFICE O/P EST SF 10 MIN: CPT | Performed by: FAMILY MEDICINE

## 2024-09-19 RX ORDER — CLOTRIMAZOLE AND BETAMETHASONE DIPROPIONATE 10; .64 MG/G; MG/G
1 CREAM TOPICAL 2 TIMES DAILY
Qty: 45 G | Refills: 3 | Status: SHIPPED | OUTPATIENT
Start: 2024-09-19

## 2025-03-14 ENCOUNTER — TELEPHONE (OUTPATIENT)
Dept: INTERNAL MEDICINE | Facility: CLINIC | Age: 55
End: 2025-03-14

## 2025-03-14 DIAGNOSIS — E78.5 HYPERLIPIDEMIA, UNSPECIFIED HYPERLIPIDEMIA TYPE: Primary | ICD-10-CM

## 2025-03-14 DIAGNOSIS — Z13.89 SCREENING FOR BLOOD OR PROTEIN IN URINE: ICD-10-CM

## 2025-03-14 DIAGNOSIS — Z12.5 SCREENING FOR MALIGNANT NEOPLASM OF PROSTATE: ICD-10-CM

## 2025-03-14 DIAGNOSIS — E66.811 OBESITY, CLASS I, BMI 30-34.9: ICD-10-CM

## 2025-03-14 DIAGNOSIS — R73.09 ELEVATED GLUCOSE: ICD-10-CM

## 2025-03-15 LAB
ALBUMIN SERPL-MCNC: 4.7 G/DL (ref 3.8–4.9)
ALP SERPL-CCNC: 57 IU/L (ref 44–121)
ALT SERPL-CCNC: 33 IU/L (ref 0–44)
APPEARANCE UR: CLEAR
AST SERPL-CCNC: 23 IU/L (ref 0–40)
BACTERIA #/AREA URNS HPF: NORMAL /[HPF]
BASOPHILS # BLD AUTO: 0 X10E3/UL (ref 0–0.2)
BASOPHILS NFR BLD AUTO: 1 %
BILIRUB SERPL-MCNC: 0.8 MG/DL (ref 0–1.2)
BILIRUB UR QL STRIP: NEGATIVE
BUN SERPL-MCNC: 16 MG/DL (ref 6–24)
BUN/CREAT SERPL: 15 (ref 9–20)
CALCIUM SERPL-MCNC: 9.1 MG/DL (ref 8.7–10.2)
CASTS URNS QL MICRO: NORMAL /LPF
CHLORIDE SERPL-SCNC: 99 MMOL/L (ref 96–106)
CHOLEST SERPL-MCNC: 158 MG/DL (ref 100–199)
CO2 SERPL-SCNC: 24 MMOL/L (ref 20–29)
COLOR UR: YELLOW
CREAT SERPL-MCNC: 1.04 MG/DL (ref 0.76–1.27)
EGFRCR SERPLBLD CKD-EPI 2021: 86 ML/MIN/1.73
EOSINOPHIL # BLD AUTO: 0.1 X10E3/UL (ref 0–0.4)
EOSINOPHIL NFR BLD AUTO: 2 %
EPI CELLS #/AREA URNS HPF: NORMAL /HPF (ref 0–10)
ERYTHROCYTE [DISTWIDTH] IN BLOOD BY AUTOMATED COUNT: 13.7 % (ref 11.6–15.4)
GLOBULIN SER CALC-MCNC: 3 G/DL (ref 1.5–4.5)
GLUCOSE SERPL-MCNC: 115 MG/DL (ref 70–99)
GLUCOSE UR QL STRIP: NEGATIVE
HBA1C MFR BLD: 5 % (ref 4.8–5.6)
HCT VFR BLD AUTO: 47.7 % (ref 37.5–51)
HDLC SERPL-MCNC: 29 MG/DL
HGB BLD-MCNC: 15.7 G/DL (ref 13–17.7)
HGB UR QL STRIP: NEGATIVE
IMM GRANULOCYTES # BLD AUTO: 0 X10E3/UL (ref 0–0.1)
IMM GRANULOCYTES NFR BLD AUTO: 0 %
KETONES UR QL STRIP: NEGATIVE
LDLC SERPL CALC-MCNC: 82 MG/DL (ref 0–99)
LEUKOCYTE ESTERASE UR QL STRIP: NEGATIVE
LYMPHOCYTES # BLD AUTO: 1.6 X10E3/UL (ref 0.7–3.1)
LYMPHOCYTES NFR BLD AUTO: 38 %
MCH RBC QN AUTO: 31.7 PG (ref 26.6–33)
MCHC RBC AUTO-ENTMCNC: 32.9 G/DL (ref 31.5–35.7)
MCV RBC AUTO: 96 FL (ref 79–97)
MICRO URNS: NORMAL
MICRO URNS: NORMAL
MONOCYTES # BLD AUTO: 0.3 X10E3/UL (ref 0.1–0.9)
MONOCYTES NFR BLD AUTO: 8 %
NEUTROPHILS # BLD AUTO: 2.1 X10E3/UL (ref 1.4–7)
NEUTROPHILS NFR BLD AUTO: 51 %
NITRITE UR QL STRIP: NEGATIVE
PH UR STRIP: 6.5 [PH] (ref 5–7.5)
PLATELET # BLD AUTO: 223 X10E3/UL (ref 150–450)
POTASSIUM SERPL-SCNC: 4.2 MMOL/L (ref 3.5–5.2)
PROT SERPL-MCNC: 7.7 G/DL (ref 6–8.5)
PROT UR QL STRIP: NEGATIVE
PSA SERPL-MCNC: 0.3 NG/ML (ref 0–4)
RBC # BLD AUTO: 4.95 X10E6/UL (ref 4.14–5.8)
RBC #/AREA URNS HPF: NORMAL /HPF (ref 0–2)
SODIUM SERPL-SCNC: 138 MMOL/L (ref 134–144)
SP GR UR STRIP: 1.01 (ref 1–1.03)
T3FREE SERPL-MCNC: 3 PG/ML (ref 2–4.4)
T4 FREE SERPL-MCNC: 1.35 NG/DL (ref 0.82–1.77)
TESTOST SERPL-MCNC: 514 NG/DL (ref 264–916)
TRIGL SERPL-MCNC: 284 MG/DL (ref 0–149)
TSH SERPL DL<=0.005 MIU/L-ACNC: 1.2 UIU/ML (ref 0.45–4.5)
UROBILINOGEN UR STRIP-MCNC: 0.2 MG/DL (ref 0.2–1)
VLDLC SERPL CALC-MCNC: 47 MG/DL (ref 5–40)
WBC # BLD AUTO: 4.1 X10E3/UL (ref 3.4–10.8)
WBC #/AREA URNS HPF: NORMAL /HPF (ref 0–5)

## 2025-04-04 ENCOUNTER — OFFICE VISIT (OUTPATIENT)
Dept: FAMILY MEDICINE CLINIC | Facility: CLINIC | Age: 55
End: 2025-04-04
Payer: COMMERCIAL

## 2025-04-04 VITALS
SYSTOLIC BLOOD PRESSURE: 136 MMHG | HEART RATE: 77 BPM | WEIGHT: 218.2 LBS | TEMPERATURE: 98.6 F | HEIGHT: 70 IN | DIASTOLIC BLOOD PRESSURE: 80 MMHG | OXYGEN SATURATION: 99 % | BODY MASS INDEX: 31.24 KG/M2

## 2025-04-04 DIAGNOSIS — G89.29 CHRONIC LEFT SHOULDER PAIN: Primary | ICD-10-CM

## 2025-04-04 DIAGNOSIS — M25.512 CHRONIC LEFT SHOULDER PAIN: Primary | ICD-10-CM

## 2025-04-04 DIAGNOSIS — E78.1 HYPERTRIGLYCERIDEMIA: ICD-10-CM

## 2025-04-04 DIAGNOSIS — L40.9 PSORIASIS: ICD-10-CM

## 2025-04-04 DIAGNOSIS — E78.6 LOW HDL (UNDER 40): ICD-10-CM

## 2025-04-04 PROCEDURE — 99213 OFFICE O/P EST LOW 20 MIN: CPT | Performed by: STUDENT IN AN ORGANIZED HEALTH CARE EDUCATION/TRAINING PROGRAM

## 2025-04-04 RX ORDER — BETAMETHASONE DIPROPIONATE 0.5 MG/G
1 CREAM TOPICAL DAILY
COMMUNITY
Start: 2025-01-31

## 2025-04-04 RX ORDER — ROFLUMILAST 3 MG/G
CREAM TOPICAL
COMMUNITY
Start: 2025-01-31

## 2025-04-04 RX ORDER — TRIAMCINOLONE ACETONIDE 1 MG/G
1 CREAM TOPICAL 2 TIMES DAILY
COMMUNITY
Start: 2025-01-31

## 2025-04-04 NOTE — PROGRESS NOTES
Chief Complaint  Establish Care (Establishing care, not fasting, pt would like to discuss shoulder pain)    Subjective        Tato Jacques presents to [facility] PRIMARY [facility]ARE  History of Present Illness    Patient here today to establish care and to discuss R shoulder pain  Previous PCP Dr. 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 Est w/ derm b/c of worsening RLE lesion. Now that he got the high-dose steroid, says its now 80% better  Derm prescribed Zoryve for his ears and genital lesion, which he says worked very well   Hematuria  Says was a one-off thing noted on U/A years ago, went to US and MRI and told everything   Erectile Dysfunction  Pt says doesn't cause him psychological distress   Hypertriglyceridemia  Says never been on statin or fish oil  Pt travels 260 days/yr, eats out a lot  Says occasionally gets winded easily and then other times he is very active and has no problem  got stress test in 08/2022 and was normal       Pt says he was supposed to have APE w/ prev PCP, but cancelled due to illness, but did get labs done     Herpes zoster listed, but pt denies any h/o oral or genital breakouts (removed from problem list)      Fhx:  Dad - Aflutter, CAD s/p CABG      #L shoulder pain  Pain first started last fall, no precipitating injury, onset insidiously   Says maybe he tweaked it at work  Feels pain on superior shoulder that he feels with overhead motions  Has put Voltaren gel on it sometimes, otherwise no other workup or mgmt         Objective   Vital Signs:  /80   Pulse 77   Temp  "98.6 °F (37 °C)   Ht 177.8 cm (70\")   Wt 99 kg (218 lb 3.2 oz)   SpO2 99%   BMI 31.31 kg/m²   Estimated body mass index is 31.31 kg/m² as calculated from the following:    Height as of this encounter: 177.8 cm (70\").    Weight as of this encounter: 99 kg (218 lb 3.2 oz).          Physical Exam  Constitutional:       General: He is not in acute distress.     Appearance: Normal appearance. He is not ill-appearing.   HENT:      Head: Normocephalic and atraumatic.   Eyes:      Extraocular Movements: Extraocular movements intact.   Cardiovascular:      Rate and Rhythm: Normal rate and regular rhythm.      Heart sounds: Normal heart sounds. No murmur heard.  Pulmonary:      Effort: Pulmonary effort is normal.   Musculoskeletal:      Left shoulder: Tenderness present. No swelling or deformity. Normal range of motion. Normal strength.      Comments: Mildly ttp along glenohumeral joint line; positive Madison's; neg empty can test; neg Hawkin's, neg Neer's   Neurological:      Mental Status: He is alert.        Result Review :    CMP          3/14/2025    09:24   CMP   Glucose 115    BUN 16    Creatinine 1.04    EGFR 86    Sodium 138    Potassium 4.2    Chloride 99    Calcium 9.1    Total Protein 7.7    Albumin 4.7    Globulin 3.0    Total Bilirubin 0.8    Alkaline Phosphatase 57    AST (SGOT) 23    ALT (SGPT) 33    BUN/Creatinine Ratio 15      CBC          3/14/2025    09:24   CBC   WBC 4.1    RBC 4.95    Hemoglobin 15.7    Hematocrit 47.7    MCV 96    MCH 31.7    MCHC 32.9    RDW 13.7    Platelets 223      Lipid Panel          3/14/2025    09:24   Lipid Panel   Total Cholesterol 158    Triglycerides 284    HDL Cholesterol 29    VLDL Cholesterol 47    LDL Cholesterol  82      TSH          3/14/2025    09:24   TSH   TSH 1.200      Most Recent A1C          3/14/2025    09:24   HGBA1C Most Recent   Hemoglobin A1C 5.0                    Assessment and Plan   Diagnoses and all orders for this visit:    1. Chronic left " shoulder pain (Primary)  -discussed w/ ssx suggestive of possible labral tear v rotator cuff tendinopathy  -pt says pain manageable w/ voltaren gel alone; also advised use of ice/heat prn  -referral to PT placed today    -     Ambulatory Referral to Physical Therapy for Evaluation & Treatment    2. Hypertriglyceridemia  3. Low HDL (under 40)  - All labs done by previous PCP reviewed with patient today  - Discussed diet while he travels is likely a contributing factor, and counseling provided on dietary interventions to improve low HDL and high triglycerides  - Also advised starting omega-3    4. Psoriasis  - Follows with Derm, endorses symptoms well-controlled on current regimen           Follow Up   Return in about 11 months (around 3/4/2026) for Annual Physical or sooner as needed.  Patient was given instructions and counseling regarding his condition or for health maintenance advice. Please see specific information pulled into the AVS if appropriate.

## 2025-04-21 PROBLEM — E78.1 HYPERTRIGLYCERIDEMIA: Status: ACTIVE | Noted: 2022-02-28

## 2025-04-21 PROBLEM — E78.6 LOW HDL (UNDER 40): Status: ACTIVE | Noted: 2025-04-21

## 2025-05-01 ENCOUNTER — TREATMENT (OUTPATIENT)
Dept: PHYSICAL THERAPY | Facility: CLINIC | Age: 55
End: 2025-05-01
Payer: COMMERCIAL

## 2025-05-01 DIAGNOSIS — G89.29 CHRONIC LEFT SHOULDER PAIN: Primary | ICD-10-CM

## 2025-05-01 DIAGNOSIS — M25.512 CHRONIC LEFT SHOULDER PAIN: Primary | ICD-10-CM

## 2025-05-01 NOTE — PROGRESS NOTES
Physical Therapy Initial Evaluation and Plan of Care    Patient: Tato Jacques   : 1970  Diagnosis/ICD-10 Code:  Chronic left shoulder pain [M25.512, G89.29]  Referring practitioner: Carol Arcos MD  Date of Initial Visit: 2025  Today's Date: 2025  Patient seen for 1 session         Visit Diagnoses:    ICD-10-CM ICD-9-CM   1. Chronic left shoulder pain  M25.512 719.41    G89.29 338.29         Subjective Questionnaire: QuickDASH: 13.64% limitation      Subjective Evaluation    History of Present Illness  Mechanism of injury: Onset of left shoulder pain last year after positioning on his left side for a colonoscopy. Drives a lot for work, feels like he has knots in the back of his left shoulder. Hurts with certain movements like flipping blankets to make the bed.       Patient Occupation: repairs automation equipment Quality of life: good    Pain  Current pain ratin  At best pain ratin  At worst pain ratin  Location: left shoulder  Quality: dull ache  Relieving factors: change in position and medications (Voltaren cream)  Progression: improved (very slowly)    Social Support  Lives in: multiple-level home  Lives with: spouse    Hand dominance: right    Diagnostic Tests  No diagnostic tests performed    Patient Goals  Patient goals for therapy: decreased pain             Objective          Static Posture     Head  Forward.    Shoulders  Rounded.    Scapulae  Left protracted and right protracted.    Observations   Left Shoulder   Negative for atrophy, deformity and edema.       Tenderness     Left Shoulder   Tenderness in the AC joint, biceps tendon (proximal), bicipital groove and supraspinatus tendon. No tenderness in the infraspinatus tendon.     Cervical/Thoracic Screen   Cervical range of motion within normal limits    Neurological Testing     Sensation     Shoulder   Left Shoulder   Intact: light touch    Right Shoulder   Intact: Light touch    Active Range of Motion   Left  Shoulder   Flexion: 162 degrees   Abduction: 140 degrees   External rotation 45°: 75 degrees   Internal rotation 45°: 64 degrees     Right Shoulder   Flexion: 180 degrees   Abduction: 180 degrees   External rotation 45°: 90 degrees   Internal rotation 45°: 72 degrees     Passive Range of Motion   Left Shoulder   Normal passive range of motion    Strength/Myotome Testing     Left Shoulder     Planes of Motion   Flexion: 3-   Extension: 4   Abduction: 3-   External rotation at 45°: 3-   Internal rotation at 45°: 3-     Isolated Muscles   Lower trapezius: 4   Middle trapezius: 4   Serratus anterior: 4     Right Shoulder     Planes of Motion   Flexion: 4+   Extension: 4+   Abduction: 4+   External rotation at 45°: 4+   Internal rotation at 45°: 4+     Isolated Muscles   Lower trapezius: 4   Middle trapezius: 4   Serratus anterior: 4     Tests     Left Shoulder   Positive empty can, full can, Hawkin's, Neer's and Speed's.   Negative apprehension and drop arm.           Assessment & Plan       Assessment  Impairments: abnormal or restricted ROM, activity intolerance, impaired physical strength, lacks appropriate home exercise program and pain with function   Functional limitations: carrying objects, lifting, uncomfortable because of pain, reaching behind back and reaching overhead   Assessment details: Pt presents with left shoulder pain, decreased left shoulder AROM, scapular stabilizer weakness and postural dysfunction. He will benefit from skilled PT to address impairments as above to allow return to baseline performance of daily activities.  Prognosis: good    Goals  Plan Goals: Short Term Goals: 6 weeks. Patient will:  1. Be independent with initial HEP  2. Be instructed in posture and body mechanics.  3. Demonstrate full L GH AROM to allow for progressing of therapy exercises.    Long Term Goals: 12 weeks. Pt will:  1. Demonstrate improved Left UE/scapular MMT of >/= 4+/5 to allow for performance of ADL's/household  management/recreational activities.  2. Pt able to reach forward and lift 10# to allow for return to doing home/work/recreational activities with min to no pain.  3. Report perceived disability 0% based on QuickDASH    Plan  Therapy options: will be seen for skilled therapy services  Planned modality interventions: cryotherapy, TENS, thermotherapy (hydrocollator packs) and ultrasound  Planned therapy interventions: manual therapy, postural training, body mechanics training, flexibility, functional ROM exercises, home exercise program, stretching, strengthening, soft tissue mobilization, joint mobilization and therapeutic activities  Frequency: 1x week  Duration in weeks: 12  Treatment plan discussed with: patient        History # of Personal Factors and/or Comorbidities: MODERATE (1-2)  Examination of Body System(s): # of elements: LOW (1-2)  Clinical Presentation: STABLE   Clinical Decision Making: LOW       Timed:         Manual Therapy:    0     mins  53573;     Therapeutic Exercise:    10     mins  21902;     Neuromuscular Magdalena:    0    mins  23163;    Therapeutic Activity:     10     mins  65338;     Gait Trainin     mins  64788;     Ultrasound:     0     mins  33504;    Ionto                               0    mins   19157  Self Care                       0     mins   99545      Un-Timed:  Electrical Stimulation:    0     mins  31842 ( );  Dry Needling     0     mins self-pay  Traction     0     mins 91276  Low Eval     20     Mins 54197  Mod Eval     0     Mins 66329  High Eval                       0     Mins 44837  Re-Eval     0     Mins 52734  Canalith Repos    0     mins 31035      Timed Treatment:   20   mins   Total Treatment:     40   mins          PT: Merna Fernández, PT     License Number: [id number]  Electronically signed by Merna Fernández, PT, 25, 7:08 AM EDT    Certification Period: 2025 thru 2025  I certify that the therapy services are furnished while this patient is  under my care.  The services outlined above are required by this patient, and will be reviewed every 90 days.         Physician Signature:__________________________________________________    PHYSICIAN: Carol Arcos MD  NPI: [id number]                                      DATE:      Please sign and return via fax to [email or web address] . Thank you, [facility].

## 2025-05-12 ENCOUNTER — TREATMENT (OUTPATIENT)
Dept: PHYSICAL THERAPY | Facility: CLINIC | Age: 55
End: 2025-05-12
Payer: COMMERCIAL

## 2025-05-12 DIAGNOSIS — G89.29 CHRONIC LEFT SHOULDER PAIN: Primary | ICD-10-CM

## 2025-05-12 DIAGNOSIS — M25.512 CHRONIC LEFT SHOULDER PAIN: Primary | ICD-10-CM

## 2025-05-12 PROCEDURE — 97110 THERAPEUTIC EXERCISES: CPT | Performed by: PHYSICAL THERAPIST

## 2025-05-12 PROCEDURE — 97112 NEUROMUSCULAR REEDUCATION: CPT | Performed by: PHYSICAL THERAPIST

## 2025-05-12 PROCEDURE — 97530 THERAPEUTIC ACTIVITIES: CPT | Performed by: PHYSICAL THERAPIST

## 2025-05-12 NOTE — PROGRESS NOTES
Physical Therapy Daily Treatment Note      Patient: Tato Jacques   : 1970  Referring practitioner: Carol Arcos MD  Date of Initial Visit: Type: THERAPY  Noted: 2025  Today's Date: 2025  Patient seen for 2 sessions       Visit Diagnoses:    ICD-10-CM ICD-9-CM   1. Chronic left shoulder pain  M25.512 719.41    G89.29 338.29       Subjective   Pt states that pain is somewhat improved, he is sleeping better. Adherent with HEP.     Objective   See Exercise, Manual, and Modality Logs for complete treatment.       Assessment/Plan    Subjectively, pt reports no increase of pain or discomfort with interventions performed today. Performed well with continued functional strengthening interventions. Continues to demonstrate good tolerance to exercise progressions. Continues to benefit from verbal/tactile cues to ensure proper form and technique for exercise performance. Issued new exercises and green theraband for HEP.         Timed:         Manual Therapy:    0     mins  09486;     Therapeutic Exercise:    10     mins  71040;     Neuromuscular Magdalena:   10    mins  90944;    Therapeutic Activity:     10     mins  10027;     Gait Trainin     mins  13834;     Ultrasound:     0     mins  81416;    Ionto                               0    mins   61626  Self Care                       0     mins   82215  Canalith Repos    0     mins  56845      Un-Timed:  Electrical Stimulation:    0     mins  92196 ( );  Dry Needling     0     mins self-pay  Traction     0     mins 16830      Timed Treatment:   30   mins   Total Treatment:     30   mins    Merna Fernández, PT  [place] License: [id number]

## 2025-05-19 ENCOUNTER — TREATMENT (OUTPATIENT)
Dept: PHYSICAL THERAPY | Facility: CLINIC | Age: 55
End: 2025-05-19
Payer: COMMERCIAL

## 2025-05-19 DIAGNOSIS — M25.512 CHRONIC LEFT SHOULDER PAIN: Primary | ICD-10-CM

## 2025-05-19 DIAGNOSIS — G89.29 CHRONIC LEFT SHOULDER PAIN: Primary | ICD-10-CM

## 2025-05-19 PROCEDURE — 97112 NEUROMUSCULAR REEDUCATION: CPT | Performed by: PHYSICAL THERAPIST

## 2025-05-19 PROCEDURE — 97110 THERAPEUTIC EXERCISES: CPT | Performed by: PHYSICAL THERAPIST

## 2025-05-19 PROCEDURE — 97530 THERAPEUTIC ACTIVITIES: CPT | Performed by: PHYSICAL THERAPIST

## 2025-05-19 NOTE — PROGRESS NOTES
Physical Therapy Daily Treatment Note      Patient: Tato Jacques   : 1970  Referring practitioner: Carol Arcos MD  Date of Initial Visit: Type: THERAPY  Noted: 2025  Today's Date: 2025  Patient seen for 3 sessions       Visit Diagnoses:    ICD-10-CM ICD-9-CM   1. Chronic left shoulder pain  M25.512 719.41    G89.29 338.29       Subjective   Pt states shoulder is feeling great. Has been having right lower quadrant pain for a couple of days. Leaves for [employer] for work tomorrow.     Objective   See Exercise, Manual, and Modality Logs for complete treatment.       Assessment/Plan    Able to progress exercises without pain. Added wall pushup plus to HEP and issued black band for rows and extensions. Advised pt to go to urgent care or the ED for abdominal pain, he agrees.         Timed:         Manual Therapy:    0     mins  95481;     Therapeutic Exercise:    10     mins  04964;     Neuromuscular Magdalena:    10    mins  20463;    Therapeutic Activity:     10     mins  07995;     Gait Trainin     mins  34963;     Ultrasound:     0     mins  77557;    Ionto                               0    mins   13153  Self Care                       0     mins   37418  Canalith Repos    0     mins  40995      Un-Timed:  Electrical Stimulation:    0     mins  80943 ( );  Dry Needling     0     mins self-pay  Traction     0     mins 22851      Timed Treatment:   30   mins   Total Treatment:     30   mins    Merna Fernández, PT  [place] License: [id number]

## 2025-05-27 ENCOUNTER — TREATMENT (OUTPATIENT)
Dept: PHYSICAL THERAPY | Facility: CLINIC | Age: 55
End: 2025-05-27
Payer: COMMERCIAL

## 2025-05-27 DIAGNOSIS — M25.512 CHRONIC LEFT SHOULDER PAIN: Primary | ICD-10-CM

## 2025-05-27 DIAGNOSIS — G89.29 CHRONIC LEFT SHOULDER PAIN: Primary | ICD-10-CM

## 2025-05-27 PROCEDURE — 97530 THERAPEUTIC ACTIVITIES: CPT | Performed by: PHYSICAL THERAPIST

## 2025-05-27 PROCEDURE — 97112 NEUROMUSCULAR REEDUCATION: CPT | Performed by: PHYSICAL THERAPIST

## 2025-05-27 NOTE — PROGRESS NOTES
Physical Therapy Daily Treatment Note      Patient: Tato Jacques   : 1970  Referring practitioner: Carol Arcos MD  Date of Initial Visit: Type: THERAPY  Noted: 2025  Today's Date: 2025  Patient seen for 4 sessions       Visit Diagnoses:    ICD-10-CM ICD-9-CM   1. Chronic left shoulder pain  M25.512 719.41    G89.29 338.29       Subjective   Pt states shoulder is steadily improving.    Objective   See Exercise, Manual, and Modality Logs for complete treatment.       Assessment/Plan    Subjectively, pt reports no increase of pain or discomfort with interventions performed today. Performed well with continued functional strengthening interventions. Continues to demonstrate good tolerance to exercise progressions. Continues to benefit from verbal/tactile cues to ensure proper form and technique for exercise performance.        Timed:         Manual Therapy:    0     mins  49782;     Therapeutic Exercise:    10     mins  47726;     Neuromuscular Magdalena:    10    mins  67409;    Therapeutic Activity:     12     mins  39738;     Gait Trainin     mins  29884;     Ultrasound:     0     mins  65305;    Ionto                               0    mins   75636  Self Care                       0     mins   62814  Canalith Repos    0     mins  92656      Un-Timed:  Electrical Stimulation:    0     mins  44826 ( );  Dry Needling     0     mins self-pay  Traction     0     mins 35640      Timed Treatment:   32   mins   Total Treatment:     32   mins    Merna Fernández PT  [place] License: [id number]

## 2025-06-02 ENCOUNTER — TREATMENT (OUTPATIENT)
Dept: PHYSICAL THERAPY | Facility: CLINIC | Age: 55
End: 2025-06-02
Payer: COMMERCIAL

## 2025-06-02 DIAGNOSIS — G89.29 CHRONIC LEFT SHOULDER PAIN: Primary | ICD-10-CM

## 2025-06-02 DIAGNOSIS — M25.512 CHRONIC LEFT SHOULDER PAIN: Primary | ICD-10-CM

## 2025-06-02 PROCEDURE — 97530 THERAPEUTIC ACTIVITIES: CPT | Performed by: PHYSICAL THERAPIST

## 2025-06-02 PROCEDURE — 97112 NEUROMUSCULAR REEDUCATION: CPT | Performed by: PHYSICAL THERAPIST

## 2025-06-02 PROCEDURE — 97110 THERAPEUTIC EXERCISES: CPT | Performed by: PHYSICAL THERAPIST

## 2025-06-02 NOTE — PROGRESS NOTES
Physical Therapy Progress Note  Patient: Tato Jacques   : 1970  Diagnosis/ICD-10 Code:  Chronic left shoulder pain [M25.512, G89.29]  Referring practitioner: Carol Arcos MD  Date of Initial Visit: Type: THERAPY  Noted: 2025  Today's Date: 2025  Patient seen for 5 sessions         Visit Diagnoses:    ICD-10-CM ICD-9-CM   1. Chronic left shoulder pain  M25.512 719.41    G89.29 338.29         Tato Jacques reports: overall 75-80% reduction in pain. Can sleep on his side comfortably now.   Subjective Questionnaire: QuickDASH: 9.09% limitation (improved from 13.64% at initial evaluation)  Clinical Progress: improved  Home Program Compliance: Yes  Treatment has included: therapeutic exercise, neuromuscular re-education, and therapeutic activity      Subjective       Objective     Active Range of Motion   Left Shoulder   Flexion: 176 degrees   Abduction: 180 degrees   External rotation 45°: 88 degrees   Internal rotation 45°: 71 degrees     Strength/Myotome Testing      Left Shoulder      Planes of Motion   Flexion: 4+  Extension: 4+   Abduction: 4+  External rotation at 45°: 4+  Internal rotation at 45°: 4+    Assessment/Plan    Plan Goals: Short Term Goals: 6 weeks. Patient will:  1. Be independent with initial HEP (MET)  2. Be instructed in posture and body mechanics. (MET)  3. Demonstrate full L GH AROM to allow for progressing of therapy exercises. (MET)     Long Term Goals: 12 weeks. Pt will:  1. Demonstrate improved Left UE/scapular MMT of >/= 4+/5 to allow for performance of ADL's/household management/recreational activities. (MET)  2. Pt able to reach forward and lift 10# to allow for return to doing home/work/recreational activities with min to no pain. (PROGRESSING)  3. Report perceived disability 0% based on QuickDASH (PROGRESSING)     Recommendations: Pt to work on HEP, follow up in 2 weeks and likely D/C at that time  Timeframe: 1 month  Prognosis to achieve goals: good      Timed:          Manual Therapy:    0     mins  39830;     Therapeutic Exercise:    24     mins  97868;     Neuromuscular Magdalena:    10    mins  48079;    Therapeutic Activity:     10     mins  45514;     Gait Trainin     mins  22707;     Ultrasound:     0     mins  99154;    Ionto                               0    mins   23685  Self Care                       0     mins   87861  Canalith Repos    0     mins 08162      Un-Timed:  Electrical Stimulation:    0     mins  92732 ( );  Dry Needling     0     mins self-pay  Traction     0     mins 01110  Re-Eval                           0    mins  24467      Timed Treatment:   44   mins   Total Treatment:     44   mins          PT: Merna Fernández PT     [place] License:  [id number]    Electronically signed by Merna Fernández PT, 25, 7:17 AM EDT

## 2025-06-23 ENCOUNTER — TREATMENT (OUTPATIENT)
Dept: PHYSICAL THERAPY | Facility: CLINIC | Age: 55
End: 2025-06-23
Payer: COMMERCIAL

## 2025-06-23 DIAGNOSIS — G89.29 CHRONIC LEFT SHOULDER PAIN: Primary | ICD-10-CM

## 2025-06-23 DIAGNOSIS — M25.512 CHRONIC LEFT SHOULDER PAIN: Primary | ICD-10-CM

## 2025-06-23 PROCEDURE — 97112 NEUROMUSCULAR REEDUCATION: CPT | Performed by: PHYSICAL THERAPIST

## 2025-06-23 PROCEDURE — 97530 THERAPEUTIC ACTIVITIES: CPT | Performed by: PHYSICAL THERAPIST

## 2025-06-23 NOTE — PROGRESS NOTES
Physical Therapy Daily Treatment Note      Patient: Tato Jacques   : 1970  Referring practitioner: Carol Arcos MD  Date of Initial Visit: Type: THERAPY  Noted: 2025  Today's Date: 2025  Patient seen for 6 sessions       Visit Diagnoses:    ICD-10-CM ICD-9-CM   1. Chronic left shoulder pain  M25.512 719.41    G89.29 338.29       Subjective   Overall shoulder is much better. Drove a lot over the span of the last week, which increased pain some.     Objective   See Exercise, Manual, and Modality Logs for complete treatment.       Assessment/Plan    Pt is independent with HEP, will follow up in 3 weeks and likely D/C at that time.         Timed:         Manual Therapy:    0     mins  95703;     Therapeutic Exercise:    10     mins  08842;     Neuromuscular Magdalena:    10    mins  24818;    Therapeutic Activity:     10     mins  24089;     Gait Trainin     mins  27680;     Ultrasound:     0     mins  31366;    Ionto                               0    mins   32303  Self Care                       0     mins   37557  Canalith Repos    0     mins  11463      Un-Timed:  Electrical Stimulation:    0     mins  74019 ( );  Dry Needling     0     mins self-pay  Traction     0     mins 65801      Timed Treatment:   30   mins   Total Treatment:     30   mins    Merna Fernández, PT  [place] License: [id number]

## (undated) DEVICE — SYR LL W/SCALE/MARK 3ML STRL

## (undated) DEVICE — FRCP BX RADJAW4 NDL 2.8 240CM LG OG BX40

## (undated) DEVICE — ADAPT CLN BIOGUARD AIR/H2O DISP

## (undated) DEVICE — LINER SURG CANSTR SXN S/RIGD 1500CC

## (undated) DEVICE — KT ORCA ORCAPOD DISP STRL

## (undated) DEVICE — SOL IRR H2O BTL 1000ML STRL

## (undated) DEVICE — VIAL FORMALIN CAP 10P 40ML

## (undated) DEVICE — BW-412T DISP COMBO CLEANING BRUSH: Brand: SINGLE USE COMBINATION CLEANING BRUSH

## (undated) DEVICE — Device